# Patient Record
Sex: FEMALE | Race: WHITE | NOT HISPANIC OR LATINO | Employment: PART TIME | URBAN - METROPOLITAN AREA
[De-identification: names, ages, dates, MRNs, and addresses within clinical notes are randomized per-mention and may not be internally consistent; named-entity substitution may affect disease eponyms.]

---

## 2017-02-10 ENCOUNTER — OFFICE VISIT (OUTPATIENT)
Dept: URGENT CARE | Facility: MEDICAL CENTER | Age: 57
End: 2017-02-10
Payer: COMMERCIAL

## 2017-02-10 PROCEDURE — S9083 URGENT CARE CENTER GLOBAL: HCPCS

## 2017-02-10 PROCEDURE — G0382 LEV 3 HOSP TYPE B ED VISIT: HCPCS

## 2017-03-07 ENCOUNTER — ALLSCRIPTS OFFICE VISIT (OUTPATIENT)
Dept: OTHER | Facility: OTHER | Age: 57
End: 2017-03-07

## 2017-03-29 ENCOUNTER — TRANSCRIBE ORDERS (OUTPATIENT)
Dept: SLEEP CENTER | Facility: CLINIC | Age: 57
End: 2017-03-29

## 2017-03-29 DIAGNOSIS — R06.83 SNORING: Primary | ICD-10-CM

## 2017-03-30 ENCOUNTER — TRANSCRIBE ORDERS (OUTPATIENT)
Dept: ADMINISTRATIVE | Facility: HOSPITAL | Age: 57
End: 2017-03-30

## 2017-03-30 ENCOUNTER — HOSPITAL ENCOUNTER (OUTPATIENT)
Dept: RADIOLOGY | Facility: MEDICAL CENTER | Age: 57
Discharge: HOME | End: 2017-03-30
Payer: COMMERCIAL

## 2017-03-30 DIAGNOSIS — R06.02 SHORTNESS OF BREATH: ICD-10-CM

## 2017-03-30 DIAGNOSIS — R05.9 COUGH: ICD-10-CM

## 2017-03-30 DIAGNOSIS — R06.02 SHORTNESS OF BREATH: Primary | ICD-10-CM

## 2017-03-30 PROCEDURE — 71020 HB CHEST X-RAY 2VW FRONTAL&LATL: CPT

## 2017-05-02 ENCOUNTER — HOSPITAL ENCOUNTER (OUTPATIENT)
Dept: SLEEP CENTER | Facility: CLINIC | Age: 57
Discharge: HOME | End: 2017-05-02
Payer: COMMERCIAL

## 2017-05-02 DIAGNOSIS — R06.83 SNORING: ICD-10-CM

## 2017-06-07 DIAGNOSIS — Z12.31 ENCOUNTER FOR SCREENING MAMMOGRAM FOR MALIGNANT NEOPLASM OF BREAST: ICD-10-CM

## 2017-08-14 DIAGNOSIS — Z12.31 ENCOUNTER FOR SCREENING MAMMOGRAM FOR MALIGNANT NEOPLASM OF BREAST: ICD-10-CM

## 2017-08-15 ENCOUNTER — HOSPITAL ENCOUNTER (OUTPATIENT)
Dept: RADIOLOGY | Facility: MEDICAL CENTER | Age: 57
Discharge: HOME | End: 2017-08-15
Payer: COMMERCIAL

## 2017-08-15 DIAGNOSIS — Z12.31 ENCOUNTER FOR SCREENING MAMMOGRAM FOR MALIGNANT NEOPLASM OF BREAST: ICD-10-CM

## 2017-08-15 PROCEDURE — G0202 SCR MAMMO BI INCL CAD: HCPCS

## 2018-01-13 VITALS
SYSTOLIC BLOOD PRESSURE: 118 MMHG | BODY MASS INDEX: 30.18 KG/M2 | HEIGHT: 62 IN | WEIGHT: 164 LBS | DIASTOLIC BLOOD PRESSURE: 70 MMHG

## 2018-01-26 ENCOUNTER — TRANSCRIBE ORDERS (OUTPATIENT)
Dept: ADMINISTRATIVE | Facility: HOSPITAL | Age: 58
End: 2018-01-26

## 2018-01-26 DIAGNOSIS — E04.2 MULTIPLE THYROID NODULES: Primary | ICD-10-CM

## 2018-02-06 ENCOUNTER — HOSPITAL ENCOUNTER (OUTPATIENT)
Dept: RADIOLOGY | Facility: MEDICAL CENTER | Age: 58
Discharge: HOME | End: 2018-02-06
Payer: COMMERCIAL

## 2018-02-06 DIAGNOSIS — E04.2 MULTIPLE THYROID NODULES: ICD-10-CM

## 2018-02-06 PROCEDURE — 76536 US EXAM OF HEAD AND NECK: CPT

## 2018-03-20 ENCOUNTER — OFFICE VISIT (OUTPATIENT)
Dept: OBGYN CLINIC | Facility: MEDICAL CENTER | Age: 58
End: 2018-03-20
Payer: COMMERCIAL

## 2018-03-20 VITALS
SYSTOLIC BLOOD PRESSURE: 112 MMHG | HEIGHT: 62 IN | BODY MASS INDEX: 30 KG/M2 | WEIGHT: 163 LBS | DIASTOLIC BLOOD PRESSURE: 62 MMHG

## 2018-03-20 DIAGNOSIS — Z12.31 ENCOUNTER FOR SCREENING MAMMOGRAM FOR MALIGNANT NEOPLASM OF BREAST: ICD-10-CM

## 2018-03-20 DIAGNOSIS — Z11.51 SCREENING FOR HUMAN PAPILLOMAVIRUS (HPV): ICD-10-CM

## 2018-03-20 DIAGNOSIS — Z01.419 ENCOUNTER FOR GYNECOLOGICAL EXAMINATION (GENERAL) (ROUTINE) WITHOUT ABNORMAL FINDINGS: Primary | ICD-10-CM

## 2018-03-20 PROCEDURE — G0145 SCR C/V CYTO,THINLAYER,RESCR: HCPCS | Performed by: OBSTETRICS & GYNECOLOGY

## 2018-03-20 PROCEDURE — S0612 ANNUAL GYNECOLOGICAL EXAMINA: HCPCS | Performed by: OBSTETRICS & GYNECOLOGY

## 2018-03-20 PROCEDURE — 87624 HPV HI-RISK TYP POOLED RSLT: CPT | Performed by: OBSTETRICS & GYNECOLOGY

## 2018-03-20 RX ORDER — FAMOTIDINE 40 MG/1
TABLET, FILM COATED ORAL
COMMUNITY
Start: 2018-03-18

## 2018-03-20 RX ORDER — SPIRONOLACTONE 25 MG/1
TABLET ORAL
COMMUNITY
Start: 2018-03-20 | End: 2019-08-13 | Stop reason: SDUPTHER

## 2018-03-20 RX ORDER — THIAMINE HCL 100 MG
TABLET ORAL
COMMUNITY
End: 2019-08-13 | Stop reason: SDUPTHER

## 2018-03-20 RX ORDER — LANOLIN ALCOHOL/MO/W.PET/CERES
CREAM (GRAM) TOPICAL
COMMUNITY
End: 2019-08-13 | Stop reason: SDUPTHER

## 2018-03-20 RX ORDER — CETIRIZINE HYDROCHLORIDE 5 MG/1
TABLET ORAL
COMMUNITY

## 2018-03-20 RX ORDER — ROSUVASTATIN CALCIUM 5 MG/1
TABLET, COATED ORAL
COMMUNITY

## 2018-03-20 RX ORDER — OMEPRAZOLE 40 MG/1
40 CAPSULE, DELAYED RELEASE ORAL EVERY MORNING
Refills: 3 | COMMUNITY
Start: 2018-01-28

## 2018-03-20 RX ORDER — AMLODIPINE BESYLATE 5 MG/1
TABLET ORAL
COMMUNITY
Start: 2018-03-18

## 2018-03-20 NOTE — PROGRESS NOTES
Aaron Ramirez is a 65 y o  female who is here for a routine gyn exam,     has no new problems       Patient Active Problem List   Diagnosis    Arthralgia of left knee    Bursitis of left knee    Esophageal reflux    Hyperlipidemia    Primary osteoarthritis of left knee         Social History     Social History    Marital status: /Civil Union     Spouse name: N/A    Number of children: N/A    Years of education: N/A     Occupational History    Not on file       Social History Main Topics    Smoking status: Never Smoker    Smokeless tobacco: Never Used    Alcohol use Yes      Comment: Social     Drug use: No    Sexual activity: Yes     Partners: Male     Other Topics Concern    Not on file     Social History Narrative    Daily coffee consumption (1 cups/day)    Recreation activities: walking          Family History   Problem Relation Age of Onset    Alzheimer's disease Mother     Heart disease Mother      cardiac disorder     Hypertension Mother    Karlie Santiago Breast cancer Mother     Diabetes Father     Lung cancer Father     Thymic aplasia Sister     Thyroid cancer Sister     Colon cancer Paternal Grandmother     Diabetes Paternal Grandmother     Arthritis Family     Breast cancer Paternal Aunt      Past Medical History:   Diagnosis Date    Fibroadenoma of breast     resolved: 1999, left    Hypertension     Myocardial infarction     Polyp, colonic     Uterine leiomyoma     Vacuum extractor delivery, delivered     1988 son       Current Outpatient Prescriptions:     amLODIPine (NORVASC) 5 mg tablet, , Disp: , Rfl:     aspirin 81 MG tablet, Take by mouth, Disp: , Rfl:     B Complex Vitamins (B COMPLEX 100 PO), Take by mouth, Disp: , Rfl:     cetirizine (ZyrTEC) 5 MG tablet, Take by mouth, Disp: , Rfl:     Cyanocobalamin (VITAMIN B-12) 2500 MCG SUBL, Place under the tongue, Disp: , Rfl:     famotidine (PEPCID) 40 MG tablet, , Disp: , Rfl:     folic acid (FOLVITE) 670 mcg tablet, Take by mouth, Disp: , Rfl:     Multiple Vitamin (MULTI-VITAMIN DAILY PO), Take by mouth, Disp: , Rfl:     omeprazole (PriLOSEC) 40 MG capsule, Take 40 mg by mouth every morning, Disp: , Rfl: 3    rosuvastatin (CRESTOR) 5 mg tablet, Take by mouth, Disp: , Rfl:     spironolactone (ALDACTONE) 25 mg tablet, , Disp: , Rfl:     Review of Systems   Constitutional: Negative for fatigue  Respiratory: Negative for shortness of breath  Cardiovascular: Negative for chest pain and palpitations  Gastrointestinal: Negative for abdominal distention, abdominal pain and constipation  Genitourinary: Negative for dyspareunia, frequency, hematuria and pelvic pain  Bladder control: stress incontinence no                             urgency   no    2 Para       denies complaints with intercourse  Gynecologic History  No LMP recorded  Last Pap:   Results were: normal  Last mammogram: 2017   Results were: normal        The following portions of the patient's history were reviewed and updated as appropriate: allergies, current medications, past family history, past medical history, past social history, past surgical history and problem list     Review of Systems  Review of Systems     Objective     /62   Ht 5' 2" (1 575 m)   Wt 73 9 kg (163 lb)   BMI 29 81 kg/m²     General Appearance:    Alert, cooperative, no distress, appears stated age                               Lungs:     Clear to auscultation bilaterally, respirations unlabored        Heart:    Regular rate and rhythm, S1 and S2 normal, no murmur, rub   or gallop   Breast Exam:  normal nipple, no mass   Abdomen:     Soft, non-tender, bowel sounds active all four quadrants,     no masses, no organomegaly     Genitalia      :Vulva: normal, no lesions  Vagina: normal mucosa  Cervix: normal appearance and thin prep PAP obtained  Uterus: normal size, anteverted, non-tender  Adnexa: normal adnexa and no mass, fullness, tenderness     Rectal:   normal Extremities:   Extremities normal, atraumatic, no cyanosis or edema       Skin:   Skin color, texture, turgor normal, no rashes or lesions   Lymph nodes:   Cervical, supraclavicular, and axillary nodes normal             Assessment:  Encounter Diagnoses   Name Primary?  Encounter for gynecological examination (general) (routine) without abnormal findings Yes    Encounter for screening mammogram for malignant neoplasm of breast            Normal gynecological evalation and well visit         Plan     Education reviewed: none

## 2018-03-23 LAB — HPV RRNA GENITAL QL NAA+PROBE: NORMAL

## 2018-03-27 LAB
LAB AP GYN PRIMARY INTERPRETATION: NORMAL
Lab: NORMAL

## 2018-10-10 ENCOUNTER — HOSPITAL ENCOUNTER (OUTPATIENT)
Dept: RADIOLOGY | Facility: MEDICAL CENTER | Age: 58
Discharge: HOME | End: 2018-10-10
Payer: COMMERCIAL

## 2018-10-10 DIAGNOSIS — Z12.31 ENCOUNTER FOR SCREENING MAMMOGRAM FOR MALIGNANT NEOPLASM OF BREAST: ICD-10-CM

## 2018-10-10 PROCEDURE — 77067 SCR MAMMO BI INCL CAD: CPT

## 2019-03-09 ENCOUNTER — OFFICE VISIT (OUTPATIENT)
Dept: URGENT CARE | Facility: MEDICAL CENTER | Age: 59
End: 2019-03-09
Payer: COMMERCIAL

## 2019-03-09 VITALS
HEIGHT: 62 IN | DIASTOLIC BLOOD PRESSURE: 96 MMHG | OXYGEN SATURATION: 97 % | WEIGHT: 177.6 LBS | TEMPERATURE: 98.7 F | SYSTOLIC BLOOD PRESSURE: 138 MMHG | RESPIRATION RATE: 16 BRPM | BODY MASS INDEX: 32.68 KG/M2 | HEART RATE: 102 BPM

## 2019-03-09 DIAGNOSIS — S20.211A CONTUSION OF RIB ON RIGHT SIDE, INITIAL ENCOUNTER: Primary | ICD-10-CM

## 2019-03-09 PROCEDURE — 99213 OFFICE O/P EST LOW 20 MIN: CPT | Performed by: PHYSICIAN ASSISTANT

## 2019-03-09 RX ORDER — LISINOPRIL 5 MG/1
5 TABLET ORAL DAILY
Refills: 3 | COMMUNITY
Start: 2019-01-03

## 2019-03-09 RX ORDER — OMEPRAZOLE 40 MG/1
CAPSULE, DELAYED RELEASE ORAL
Refills: 3 | COMMUNITY
Start: 2019-01-20

## 2019-03-09 NOTE — PROGRESS NOTES
[facility]        NAME: Marco Dawkins is a 63 y o  female  : 1960    MRN: [id number]  DATE: 2019  TIME: 9:08 AM    Assessment and Plan   Contusion of rib on right side, initial encounter [S20 211A]  1  Contusion of rib on right side, initial encounter  XR ribs right w pa chest min 3 views         Patient Instructions     Contusion ribs  Contusion left wrist  Rest, ice, elevate, over the counter ibuprofen  Follow up with PCP in 3-5 days  Proceed to  ER if symptoms worsen  Chief Complaint     Chief Complaint   Patient presents with   [place]     pt fell last night pain under ight rib and left wrist and knee- fell around 66         History of Present Illness       64 y/o female who [place] the fire [place] was going up the stairs her foot got stuck in a step and fell forward  Patient c/o pain to left wrist, left knee and mostly on right ribs  Denies head trauma, LOC, n/v, dizziness      Review of Systems   Review of Systems   Constitutional: Negative  HENT: Negative  Eyes: Negative  Respiratory: Negative  Negative for cough, chest tightness, shortness of breath, wheezing and stridor  Cardiovascular: Negative  Negative for chest pain, palpitations and leg swelling  Musculoskeletal: Positive for myalgias           Current Medications       Current Outpatient Medications:     amLODIPine (NORVASC) 5 mg tablet, , Disp: , Rfl:     aspirin 81 MG tablet, Take by mouth, Disp: , Rfl:     B Complex Vitamins (B COMPLEX 100 PO), Take by mouth, Disp: , Rfl:     cetirizine (ZyrTEC) 5 MG tablet, Take by mouth, Disp: , Rfl:     Cyanocobalamin (VITAMIN B-12) 2500 MCG SUBL, Place under the tongue, Disp: , Rfl:     famotidine (PEPCID) 40 MG tablet, , Disp: , Rfl:     folic acid (FOLVITE) 200 mcg tablet, Take by mouth, Disp: , Rfl:     lisinopril (ZESTRIL) 5 mg tablet, Take 5 mg by mouth daily, Disp: , Rfl: 3    Multiple Vitamin (MULTI-VITAMIN DAILY PO), Take by mouth, Disp: , Rfl:     omeprazole (PriLOSEC) 40 MG capsule, Take 40 mg by mouth every morning, Disp: , Rfl: 3    omeprazole (PriLOSEC) 40 MG capsule, TAKE ONE CAPSULE BY MOUTH IN THE MORNING, Disp: , Rfl: 3    rosuvastatin (CRESTOR) 5 mg tablet, Take by mouth, Disp: , Rfl:     spironolactone (ALDACTONE) 25 mg tablet, , Disp: , Rfl:     Current Allergies     Allergies as of 03/09/2019 - Reviewed 03/09/2019   Allergen Reaction Noted    Penicillins Rash 08/15/2012    Sulfa antibiotics Rash 08/15/2012            The following portions of the patient's history were reviewed and updated as appropriate: allergies, current medications, past family history, past medical history, past social history, past surgical history and problem list      Past Medical History:   Diagnosis Date    Fibroadenoma of breast     resolved: 1999, left    Hypertension     Myocardial infarction ([address] )     Polyp, colonic     Uterine leiomyoma     Vacuum extractor delivery, delivered     81 son       Past Surgical History:   Procedure Laterality Date    BREAST LUMPECTOMY Left     resolved: 1999; fibroadenoma   [address] [facility] son    COLONOSCOPY      complete    ESOPHAGOGASTRODUODENOSCOPY      ROTATOR CUFF REPAIR Right 2008       Family History   Problem Relation Age of Onset    Alzheimer's disease Mother     Heart disease Mother         cardiac disorder     Hypertension Mother     Breast cancer Mother     Diabetes Father     Lung cancer Father     Thymic aplasia Sister     Thyroid cancer Sister     Colon cancer Paternal Grandmother     Diabetes Paternal Grandmother     Arthritis Family     Breast cancer Paternal Aunt          Medications have been verified          Objective   /96   Pulse 102   Temp 98 7 °F (37 1 °C) (Temporal)   Resp 16   Ht 5' 2" (1 575 m)   Wt 80 6 kg (177 lb 9 6 oz)   SpO2 97%   BMI 32 48 kg/m²        Physical Exam     Physical Exam   Constitutional: She appears well-developed and well-nourished  HENT:   Head: Normocephalic and atraumatic  Right Ear: External ear normal    Left Ear: External ear normal    Nose: Nose normal    Mouth/Throat: Oropharynx is clear and moist  No oropharyngeal exudate  Neck: Normal range of motion  Neck supple  Cardiovascular: Normal rate, regular rhythm, normal heart sounds and intact distal pulses  Pulmonary/Chest: Effort normal and breath sounds normal  No respiratory distress  She has no wheezes  She has no rales  She exhibits no tenderness  Lymphadenopathy:     She has no cervical adenopathy       Xray negative

## 2019-03-09 NOTE — PATIENT INSTRUCTIONS
Contusion ribs  Contusion left wrist  Rest, ice, elevate, over the counter ibuprofen  Follow up with PCP in 3-5 days  Proceed to  ER if symptoms worsen  Rib Contusion   WHAT YOU NEED TO KNOW:   A rib contusion is a bruise on one or more of your ribs  DISCHARGE INSTRUCTIONS:   Return to the emergency department if:   · You have increased chest pain  · You have shortness of breath  · You start to cough up blood  · Your pain does not improve with pain medicine  Contact your healthcare provider if:   · You have a cough  · You have a fever  · You have questions or concerns about your condition or care  Medicines: You may need any of the following:  · NSAIDs , such as ibuprofen, help decrease swelling, pain, and fever  This medicine is available with or without a doctor's order  NSAIDs can cause stomach bleeding or kidney problems in certain people  If you take blood thinner medicine, always ask if NSAIDs are safe for you  Always read the medicine label and follow directions  Do not give these medicines to children under 10months of age without direction from your child's healthcare provider  · Prescription pain medicine  may be given  Ask how to take this medicine safely  · Take your medicine as directed  Contact your healthcare provider if you think your medicine is not helping or if you have side effects  Tell him of her if you are allergic to any medicine  Keep a list of the medicines, vitamins, and herbs you take  Include the amounts, and when and why you take them  Bring the list or the pill bottles to follow-up visits  Carry your medicine list with you in case of an emergency  Deep breathing:   · To help prevent pneumonia, take 10 deep breaths every hour, even when you wake up during the night  Brace your ribs with your hands or a pillow while you take deep breaths or cough  This will help decrease your pain      · You may need to use an incentive spirometer to help you take deeper breaths  Put the plastic piece into your mouth and take a very deep breath  Hold your breath as long as you can  Then let out your breath  Do this 10 times in a row every hour while you are awake  Rest:  Rest your ribs to decrease swelling and allow the injury to heal faster  Avoid activities that may cause more pain or damage to your ribs  As your pain decreases, begin movements slowly  Ice:  Ice helps decrease swelling and pain  Ice may also help prevent tissue damage  Use an ice pack or put crushed ice in a plastic bag  Cover it with a towel and place it on your bruised area for 15 to 20 minutes every hour as directed  Follow up with your healthcare provider as directed:  Write down your questions so you remember to ask them during your visits  © 2017 [facility] Information is for End User's use only and may not be sold, redistributed or otherwise used for commercial purposes  All illustrations and images included in CareNotes® are the copyrighted property of A D A M [employer] Inc  or Augie Aparicio  The above information is an  only  It is not intended as medical advice for individual conditions or treatments  Talk to your doctor, nurse or pharmacist before following any medical regimen to see if it is safe and effective for you

## 2019-08-12 ENCOUNTER — TELEPHONE (OUTPATIENT)
Dept: OBGYN CLINIC | Facility: CLINIC | Age: 59
End: 2019-08-12

## 2019-08-12 NOTE — TELEPHONE ENCOUNTER
Pt calling with complaint of possible yeast infection, burning and itching  She has been on antibiotics for something else, this is the 2nd week of having these symptoms  It did go away but came back since had to take more antibiotics  Has not tried anything OTC  Yearly scheduled for tomorrow with [place] Hayes that pt is aware may be changed to an office visit if unable to treat over the phone

## 2019-08-13 ENCOUNTER — ANNUAL EXAM (OUTPATIENT)
Dept: OBGYN CLINIC | Facility: MEDICAL CENTER | Age: 59
End: 2019-08-13
Payer: COMMERCIAL

## 2019-08-13 VITALS
WEIGHT: 186.8 LBS | BODY MASS INDEX: 34.37 KG/M2 | SYSTOLIC BLOOD PRESSURE: 102 MMHG | HEIGHT: 62 IN | DIASTOLIC BLOOD PRESSURE: 72 MMHG

## 2019-08-13 DIAGNOSIS — Z12.31 ENCOUNTER FOR SCREENING MAMMOGRAM FOR MALIGNANT NEOPLASM OF BREAST: ICD-10-CM

## 2019-08-13 DIAGNOSIS — Z01.419 ROUTINE GYNECOLOGICAL EXAMINATION: Primary | ICD-10-CM

## 2019-08-13 PROCEDURE — S0612 ANNUAL GYNECOLOGICAL EXAMINA: HCPCS | Performed by: PHYSICIAN ASSISTANT

## 2019-08-13 RX ORDER — CEFACLOR 500 MG
500 CAPSULE ORAL 2 TIMES DAILY
Refills: 0 | COMMUNITY
Start: 2019-08-05 | End: 2019-08-13 | Stop reason: SDUPTHER

## 2019-08-13 RX ORDER — CEFACLOR 500 MG
CAPSULE ORAL
COMMUNITY
Start: 2019-08-05 | End: 2019-08-13 | Stop reason: SDUPTHER

## 2019-08-13 RX ORDER — PREDNISONE 10 MG/1
TABLET ORAL
Refills: 0 | COMMUNITY
Start: 2019-08-05 | End: 2020-07-06

## 2019-08-13 NOTE — ASSESSMENT & PLAN NOTE
I have discussed the importance of monthly self-breast exams, exercise and healthy diet as well as adequate intake of calcium and vitamin D  The patient declines STD testing  The current ASCCP guidelines were reviewed  The low risk patient will receive pap smear screening every 3 years or pap with HPV co-testing every 5 years  The patient previously had PAP in 2018 and is due again in 4 years  I emphasized the importance of an annual pelvic and breast exam  A yearly mammogram is recommended for breast cancer screening starting at age 36  All questions have been answered to her satisfaction

## 2019-08-13 NOTE — PROGRESS NOTES
Assessment/Plan:    Routine gynecological examination  I have discussed the importance of monthly self-breast exams, exercise and healthy diet as well as adequate intake of calcium and vitamin D  The patient declines STD testing  The current ASCCP guidelines were reviewed  The low risk patient will receive pap smear screening every 3 years or pap with HPV co-testing every 5 years  The patient previously had PAP in 2018 and is due again in 4 years  I emphasized the importance of an annual pelvic and breast exam  A yearly mammogram is recommended for breast cancer screening starting at age 36  All questions have been answered to her satisfaction  Diagnoses and all orders for this visit:    Routine gynecological examination    Encounter for screening mammogram for malignant neoplasm of breast  -     Mammo screening bilateral w 3d & cad; Future    Other orders  -     Discontinue: cefaclor (CECLOR) 500 mg capsule; Take 500 mg by mouth 2 (two) times a day  -     Discontinue: cefaclor (CECLOR) 500 mg capsule  -     predniSONE 10 mg tablet; TAKE 2 TABLETS TWICE A DAY FOR 5 DAYS        Subjective:      Patient ID: Nini Leal is a 63 y o  female  The patient comes in today for annual Gyn exam  The patient has no history of PMB, pelvic pain, abnormal vaginal discharge, breast mass or lumps, urinary symptoms, urinary incontinence, depression, and pelvic/vaginal pressure  Pt reports all additional systems reviewed are negative  The patient admits to monthly self breast exams, regular exercise, healthy diet, and calcium and Vitamin D intake  Called office yesterday d/t s/sx yeast infection (recently had two rounds ABX) - had irritation, pruritus, white d/c  Took Diflucan dose - feels much improved today  Aware to take 2nd dose in 72 hrs  Last PAP 2018, WNL   Hpv neg  Up to date w/ mammo/colonoscopy        The following portions of the patient's history were reviewed and updated as appropriate: allergies, current medications, past family history, past medical history, past social history, past surgical history and problem list     Review of Systems   Constitutional: Negative for appetite change, fatigue and unexpected weight change  Respiratory: Negative for chest tightness and shortness of breath  Cardiovascular: Negative for chest pain, palpitations and leg swelling  Gastrointestinal: Negative for abdominal pain, constipation, diarrhea, nausea and vomiting  Genitourinary: Negative for difficulty urinating, dyspareunia, menstrual problem, pelvic pain and vaginal discharge  Musculoskeletal: Negative for arthralgias and myalgias  Neurological: Negative for dizziness, light-headedness and headaches  Psychiatric/Behavioral: Negative for dysphoric mood  The patient is not nervous/anxious  All other systems reviewed and are negative  Objective:      /72 (BP Location: Left arm, Patient Position: Sitting, Cuff Size: Standard)   Ht 5' 2" (1 575 m)   Wt 84 7 kg (186 lb 12 8 oz)   Breastfeeding? No   BMI 34 17 kg/m²          Physical Exam   Constitutional: She is oriented to person, place, and time  She appears well-developed and well-nourished  HENT:   Head: Normocephalic and atraumatic  Neck: Neck supple  No thyromegaly present  Cardiovascular: Normal rate and regular rhythm  Pulmonary/Chest: Effort normal and breath sounds normal  Right breast exhibits no inverted nipple, no mass, no nipple discharge, no skin change and no tenderness  Left breast exhibits no inverted nipple, no mass, no nipple discharge, no skin change and no tenderness  No breast tenderness, discharge or bleeding  Abdominal: Soft  Bowel sounds are normal  Hernia confirmed negative in the right inguinal area and confirmed negative in the left inguinal area  Genitourinary: Vagina normal and uterus normal  No breast tenderness, discharge or bleeding  There is no rash, tenderness, lesion or injury on the right labia  There is no rash, tenderness, lesion or injury on the left labia  Uterus is not deviated, not enlarged, not fixed and not tender  Cervix exhibits no motion tenderness, no discharge and no friability  Right adnexum displays no mass, no tenderness and no fullness  Left adnexum displays no mass, no tenderness and no fullness  No vaginal discharge found  Neurological: She is alert and oriented to person, place, and time  Skin: Skin is warm and dry  Psychiatric: She has a normal mood and affect  Nursing note and vitals reviewed

## 2020-02-04 ENCOUNTER — HOSPITAL ENCOUNTER (OUTPATIENT)
Dept: RADIOLOGY | Facility: MEDICAL CENTER | Age: 60
Discharge: HOME | End: 2020-02-04
Payer: COMMERCIAL

## 2020-02-04 VITALS — WEIGHT: 186 LBS | BODY MASS INDEX: 34.23 KG/M2 | HEIGHT: 62 IN

## 2020-02-04 DIAGNOSIS — Z12.31 ENCOUNTER FOR SCREENING MAMMOGRAM FOR MALIGNANT NEOPLASM OF BREAST: ICD-10-CM

## 2020-02-04 PROCEDURE — 77063 BREAST TOMOSYNTHESIS BI: CPT

## 2020-02-04 PROCEDURE — 77067 SCR MAMMO BI INCL CAD: CPT

## 2020-02-12 ENCOUNTER — HOSPITAL ENCOUNTER (OUTPATIENT)
Dept: ULTRASOUND IMAGING | Facility: CLINIC | Age: 60
Discharge: HOME | End: 2020-02-12
Payer: COMMERCIAL

## 2020-02-12 DIAGNOSIS — R92.8 ABNORMAL MAMMOGRAM: ICD-10-CM

## 2020-02-12 PROCEDURE — 76642 ULTRASOUND BREAST LIMITED: CPT

## 2020-07-06 ENCOUNTER — APPOINTMENT (OUTPATIENT)
Dept: RADIOLOGY | Facility: MEDICAL CENTER | Age: 60
End: 2020-07-06
Payer: COMMERCIAL

## 2020-07-06 ENCOUNTER — OFFICE VISIT (OUTPATIENT)
Dept: OBGYN CLINIC | Facility: MEDICAL CENTER | Age: 60
End: 2020-07-06
Payer: COMMERCIAL

## 2020-07-06 VITALS
TEMPERATURE: 99.2 F | BODY MASS INDEX: 32.72 KG/M2 | SYSTOLIC BLOOD PRESSURE: 123 MMHG | DIASTOLIC BLOOD PRESSURE: 80 MMHG | HEART RATE: 101 BPM | WEIGHT: 177.8 LBS | HEIGHT: 62 IN

## 2020-07-06 DIAGNOSIS — M79.642 HAND PAIN, LEFT: ICD-10-CM

## 2020-07-06 DIAGNOSIS — M19.032 ARTHRITIS OF LEFT WRIST: ICD-10-CM

## 2020-07-06 DIAGNOSIS — M19.031 ARTHRITIS OF RIGHT WRIST: ICD-10-CM

## 2020-07-06 DIAGNOSIS — M79.641 HAND PAIN, RIGHT: ICD-10-CM

## 2020-07-06 DIAGNOSIS — M79.642 HAND PAIN, LEFT: Primary | ICD-10-CM

## 2020-07-06 PROCEDURE — 73130 X-RAY EXAM OF HAND: CPT

## 2020-07-06 PROCEDURE — 99203 OFFICE O/P NEW LOW 30 MIN: CPT | Performed by: ORTHOPAEDIC SURGERY

## 2020-07-06 PROCEDURE — 20605 DRAIN/INJ JOINT/BURSA W/O US: CPT | Performed by: ORTHOPAEDIC SURGERY

## 2020-07-06 RX ORDER — ALPRAZOLAM 0.25 MG/1
TABLET ORAL
COMMUNITY
Start: 2020-03-23

## 2020-07-06 RX ORDER — ETHACRYNIC ACID 25 MG/1
TABLET ORAL
COMMUNITY
Start: 2019-12-18 | End: 2020-07-06

## 2020-07-06 RX ORDER — AZELASTINE 1 MG/ML
SPRAY, METERED NASAL
COMMUNITY
Start: 2020-06-13

## 2020-07-06 RX ORDER — DOXYCYCLINE 100 MG/1
100 CAPSULE ORAL 2 TIMES DAILY
COMMUNITY
Start: 2020-06-09 | End: 2020-07-06

## 2020-07-06 RX ADMIN — LIDOCAINE HYDROCHLORIDE 0.5 ML: 10 INJECTION, SOLUTION INFILTRATION; PERINEURAL at 14:22

## 2020-07-06 RX ADMIN — LIDOCAINE HYDROCHLORIDE 2 ML: 10 INJECTION, SOLUTION INFILTRATION; PERINEURAL at 14:22

## 2020-07-06 RX ADMIN — Medication 0.5 MEQ: at 14:22

## 2020-07-06 RX ADMIN — TRIAMCINOLONE ACETONIDE 20 MG: 40 INJECTION, SUSPENSION INTRA-ARTICULAR; INTRAMUSCULAR at 14:22

## 2020-07-06 NOTE — PROGRESS NOTES
CHIEF COMPLAINT:  Chief Complaint   Patient presents with    Left Hand - Pain    Right Hand - Pain       SUBJECTIVE:  Alethia Phalen is a 62y o  year old RHD female who presents today for bilateral hand pain  She states that over the last couple of months she has had increase hand pain  Right is worse than left  She notes deformity of the right at the thumb and MP joints of the index and long fingers  She has taken Tylenol no NSAIDs, use some ice, not heat  No topical agents  Denies numbness and tingling into the fingers of either hand  Denies weakness in the hands  She works as a cook at [employer]         PAST MEDICAL HISTORY:  Past Medical History:   Diagnosis Date    Fibroadenoma of breast     resolved: , left    Hypertension     Myocardial infarction ([address] )     Polyp, colonic     Uterine leiomyoma     Vacuum extractor delivery, delivered      son       PAST SURGICAL HISTORY:  Past Surgical History:   Procedure Laterality Date    BREAST CYST EXCISION Left      SECTION      1986 son    COLONOSCOPY      complete    ESOPHAGOGASTRODUODENOSCOPY      ROTATOR CUFF REPAIR Right        FAMILY HISTORY:  Family History   Problem Relation Age of Onset    Alzheimer's disease Mother     Heart disease Mother         cardiac disorder     Hypertension Mother     Breast cancer Mother 78    Diabetes Father     Lung cancer Father     Thymic aplasia Sister     Thyroid cancer Sister     Colon cancer Paternal Grandmother 66    Diabetes Paternal Grandmother     Arthritis Family     Breast cancer Paternal Aunt 72    No Known Problems Maternal Grandmother     No Known Problems Maternal Grandfather     No Known Problems Paternal Grandfather     No Known Problems Son     No Known Problems Son     No Known Problems Brother     No Known Problems Sister     No Known Problems Paternal Aunt     No Known Problems Paternal Aunt     No Known Problems Paternal Aunt        SOCIAL HISTORY:  Social History     Tobacco Use    Smoking status: Never Smoker    Smokeless tobacco: Never Used   Substance Use Topics    Alcohol use: Yes     Comment: Social     Drug use: No       MEDICATIONS:    Current Outpatient Medications:     ALPRAZolam (XANAX) 0 25 mg tablet, Take by mouth, Disp: , Rfl:     amLODIPine (NORVASC) 5 mg tablet, , Disp: , Rfl:     azelastine (ASTELIN) 0 1 % nasal spray, INSTILL 2 SPRAYS IN EACH NOSTRIL TWICE A DAY, Disp: , Rfl:     cetirizine (ZyrTEC) 5 MG tablet, Take by mouth, Disp: , Rfl:     famotidine (PEPCID) 40 MG tablet, , Disp: , Rfl:     lisinopril (ZESTRIL) 5 mg tablet, Take 5 mg by mouth daily, Disp: , Rfl: 3    omeprazole (PriLOSEC) 40 MG capsule, Take 40 mg by mouth every morning, Disp: , Rfl: 3    omeprazole (PriLOSEC) 40 MG capsule, TAKE ONE CAPSULE BY MOUTH IN THE MORNING, Disp: , Rfl: 3    rosuvastatin (CRESTOR) 5 mg tablet, Take by mouth, Disp: , Rfl:     ALLERGIES:  Allergies   Allergen Reactions    Penicillin G Rash    Penicillins Rash    Sulfa Antibiotics Rash    Sulfamethoxazole-Trimethoprim Rash       REVIEW OF SYSTEMS:  Review of Systems   Constitutional: Negative for chills, fever and unexpected weight change  HENT: Negative for hearing loss, nosebleeds and sore throat  Eyes: Negative for pain, redness and visual disturbance  Respiratory: Negative for cough, shortness of breath and wheezing  Cardiovascular: Negative for chest pain, palpitations and leg swelling  Gastrointestinal: Negative for abdominal pain and nausea  Genitourinary: Negative for dyspareunia, dysuria and frequency  Skin: Negative for rash and wound  Neurological: Negative for dizziness, numbness and headaches  Psychiatric/Behavioral: Negative for decreased concentration and suicidal ideas  The patient is not nervous/anxious          VITALS:  Vitals:    07/06/20 1358   BP: 123/80   Pulse: 101   Temp: 99 2 °F (37 3 °C)       LABS:  HgA1c: No results found for: HGBA1C  BMP:   Lab Results   Component Value Date    GLUCOSE 98 08/03/2015    CALCIUM 9 9 08/03/2015     08/03/2015    K 4 1 08/03/2015    CO2 28 08/03/2015     08/03/2015    BUN 15 08/03/2015    CREATININE 0 75 08/03/2015       _____________________________________________________  PHYSICAL EXAMINATION:  General: well developed and well nourished, alert, oriented times 3 and appears comfortable  Psychiatric: Normal  HEENT: Trachea Midline, No torticollis  Pulmonary: No audible wheezing or strider  Cardiovascular: No discernable arrhythmia   Skin: No masses, erythema, lacerations, fluctation, ulcerations  Neurovascular: Sensation Intact to the Median, Ulnar, Radial Nerve, Motor Intact to the Median, Ulnar, Radial Nerve and Pulses Intact    MUSCULOSKELETAL EXAMINATION:  Right hand:     Skin is intact no ecchymosis  Mild swelling over the radiocarpal joint, MP joints of the index and long fingers  + TTP over the radiocarpal joint, CMC joint of thumb and MP joints of the index and long fingers  Has painful but full ROM of all fingers  Sensation is intact to light touch, Radial pulse +2  Brisk capillary refill     Left hand:    Skin is intact no ecchymosis  No swelling over the MP joints of the index and long fingers  Has painful but full ROM of all fingers     Sensation is intact to light touch, Radial pulse +2  Brisk capillary refill     ___________________________________________________  STUDIES REVIEWED:  Images obtained today of the right hand 3 views demonstrate arthritic change at the Radiocarpal joint, CMC joint of the thumb,   Images obtained today of the left hand 3 views demonstrate arthritic change at the MP joints of the index and long fingers    PROCEDURES PERFORMED:  Medium joint arthrocentesis: R radiocarpal  Date/Time: 7/6/2020 2:22 PM  Consent given by: patient  Site marked: site marked  Timeout: Immediately prior to procedure a time out was called to verify the correct patient, procedure, equipment, support staff and site/side marked as required   Supporting Documentation  Indications: joint swelling and pain   Procedure Details  Location: wrist - R radiocarpal  Preparation: Patient was prepped and draped in the usual sterile fashion  Needle size: 25 G  Ultrasound guidance: no  Approach: dorsal  Medications administered: 0 5 mEq sodium bicarbonate 8 4 %; 2 mL lidocaine 1 %; 0 5 mL lidocaine 1 %; 20 mg triamcinolone acetonide 40 mg/mL    Patient tolerance: patient tolerated the procedure well with no immediate complications  Dressing:  Sterile dressing applied        _____________________________________________________  ASSESSMENT/PLAN:    Right radiocarpal joint arthritis     * Xrays of the right hand were reviewed that shows arthritic changes in multiple joints  * On exam, she had multiple areas of pain and tenderness  * A right radiocarpal joint injection was given   * At the next visit, the injection response will be evaluated and may consider a right thumb CMC joint injection  Left hand and wrist osteoarthritis     * xrays of the left hand were reviewed that show osteoarthritis of the hand  * At this time no treatments for the left will be done  * Continue with Extra Strength Tylenol  Patient has declined wanting to take NSAIDs  * Bracing not considered for arthritis of the hands and wrists due to it increasing pain  * Suggested to use heat in the morning to increase motion, after work or activities use ice and then back to heat prior to bed  Diagnoses and all orders for this visit:    Hand pain, left  Hand pain, right  Arthritis of right wrist  Arthritis of left wrist    Follow Up:  Return in about 2 weeks (around 7/20/2020) for  right wrist     Work/school status:  Full Duty    To Do Next Visit:  Re-evaluation of current issue    General Discussions:  Osteoarthritis:  The anatomy and physiology of osteoarthritis was discussed with the patient today in the office  Deterioration of the articular cartilage eventually leads to altered mobility at the joint, resulting in joint subluxation, osteophyte formation, cystic changes, as well as subchondral sclerosis  Eventually, pain, limited mobility, and compensatory hypermobility at surrounding joints may develop  While normal activity and usage of the joint may provide a painful experience to the patient, this typically does not result in damage to the limb  Treatment options include splints to decreased joint edema, pain, and inflammation  Therapy exercises to strengthen the surrounding musculature may relieve pain, but do not alter the overall continued development of osteoarthritis  Oral medications, topical medications, corticosteroid injections may decrease pain and increase overall function  Eventually, some patients may require surgical intervention         Scribe Attestation    I,:   Hari Zacarias am acting as a scribe while in the presence of the attending physician :        I,:   Aníbal Yung MD personally performed the services described in this documentation    as scribed in my presence :

## 2020-07-07 RX ORDER — TRIAMCINOLONE ACETONIDE 40 MG/ML
20 INJECTION, SUSPENSION INTRA-ARTICULAR; INTRAMUSCULAR
Status: COMPLETED | OUTPATIENT
Start: 2020-07-06 | End: 2020-07-06

## 2020-07-07 RX ORDER — LIDOCAINE HYDROCHLORIDE 10 MG/ML
0.5 INJECTION, SOLUTION INFILTRATION; PERINEURAL
Status: COMPLETED | OUTPATIENT
Start: 2020-07-06 | End: 2020-07-06

## 2020-07-07 RX ORDER — LIDOCAINE HYDROCHLORIDE 10 MG/ML
2 INJECTION, SOLUTION INFILTRATION; PERINEURAL
Status: COMPLETED | OUTPATIENT
Start: 2020-07-06 | End: 2020-07-06

## 2020-10-01 ENCOUNTER — ANNUAL EXAM (OUTPATIENT)
Dept: OBGYN CLINIC | Facility: MEDICAL CENTER | Age: 60
End: 2020-10-01
Payer: COMMERCIAL

## 2020-10-01 VITALS
BODY MASS INDEX: 31.09 KG/M2 | SYSTOLIC BLOOD PRESSURE: 132 MMHG | DIASTOLIC BLOOD PRESSURE: 84 MMHG | TEMPERATURE: 99.6 F | WEIGHT: 170 LBS

## 2020-10-01 DIAGNOSIS — Z01.419 ROUTINE GYNECOLOGICAL EXAMINATION: Primary | ICD-10-CM

## 2020-10-01 DIAGNOSIS — Z12.31 ENCOUNTER FOR SCREENING MAMMOGRAM FOR MALIGNANT NEOPLASM OF BREAST: ICD-10-CM

## 2020-10-01 PROCEDURE — S0612 ANNUAL GYNECOLOGICAL EXAMINA: HCPCS | Performed by: NURSE PRACTITIONER

## 2021-01-09 ENCOUNTER — NURSE TRIAGE (OUTPATIENT)
Dept: OTHER | Facility: OTHER | Age: 61
End: 2021-01-09

## 2021-01-09 DIAGNOSIS — Z20.828 EXPOSURE TO SARS-ASSOCIATED CORONAVIRUS: Primary | ICD-10-CM

## 2021-01-09 NOTE — TELEPHONE ENCOUNTER
Regarding: COVID/ Exposure  ----- Message from [facility] sent at 1/9/2021  7:29 AM EST -----  Pt states: "I was just with my son who tested positive for COVID and my  comes home from the hospital soon so I want to get tested to make sure I'm not sick "

## 2021-01-09 NOTE — TELEPHONE ENCOUNTER
Reason for Disposition   [1] CLOSE CONTACT COVID-19 EXPOSURE within last 14 days AND [2] NO symptoms    Answer Assessment - Initial Assessment Questions  1  COVID-19 CLOSE CONTACT: "Who is the person with the confirmed or suspected COVID-19 infection that you were exposed to?"      Son    2  PLACE of CONTACT: "Where were you when you were exposed to COVID-19?" (e g , home, school, medical waiting room; which city?)      Son's home    3  TYPE of CONTACT: "How much contact was there?" (e g , sitting next to, live in same house, work in same office, same building)      Standing next to him     4  DURATION of CONTACT: "How long were you in contact with the COVID-19 patient?" (e g , a few seconds, passed by person, a few minutes, 15 minutes or longer, live with the patient)      10-15 mins     5  MASK: "Were you wearing a mask?" "Was the other person wearing a mask?" Note: wearing a mask reduces the risk of an   otherwise close contact  Denies     6  DATE of CONTACT: "When did you have contact with a COVID-19 patient?" (e g , how many days ago)      1/6/2020    7  COMMUNITY SPREAD: "Are there lots of cases of COVID-19 (community spread) where you live?" (See public health department website, if unsure)        [facility]     8  SYMPTOMS: "Do you have any symptoms?" (e g , fever, cough, breathing difficulty, loss of taste or smell)      Denies     9  PREGNANCY OR POSTPARTUM: "Is there any chance you are pregnant?" "When was your last menstrual period?" "Did you deliver in the last 2 weeks?"      Denies     10  HIGH RISK: "Do you have any heart or lung problems? Do you have a weak immune system?" (e g , heart failure, COPD, asthma, HIV positive, chemotherapy, renal failure, diabetes mellitus, sickle cell anemia, obesity)        Sleep apnea     11    TRAVEL: "Have you traveled out of the country recently?" If so, "When and where?"  Also ask about out-of-state travel, since the CDC has identified some high-risk cities for community spread in the [address] [place] [address] [address]  Note: Travel becomes less relevant if there is widespread community transmission where the patient lives          Denies    Protocols used: CORONAVIRUS (COVID-19) EXPOSURE-ADULT-AH

## 2021-01-10 DIAGNOSIS — Z20.828 EXPOSURE TO SARS-ASSOCIATED CORONAVIRUS: ICD-10-CM

## 2021-01-10 PROCEDURE — U0003 INFECTIOUS AGENT DETECTION BY NUCLEIC ACID (DNA OR RNA); SEVERE ACUTE RESPIRATORY SYNDROME CORONAVIRUS 2 (SARS-COV-2) (CORONAVIRUS DISEASE [COVID-19]), AMPLIFIED PROBE TECHNIQUE, MAKING USE OF HIGH THROUGHPUT TECHNOLOGIES AS DESCRIBED BY CMS-2020-01-R: HCPCS | Performed by: FAMILY MEDICINE

## 2021-01-10 PROCEDURE — U0005 INFEC AGEN DETEC AMPLI PROBE: HCPCS | Performed by: FAMILY MEDICINE

## 2021-01-12 LAB — SARS-COV-2 RNA SPEC QL NAA+PROBE: NOT DETECTED

## 2021-02-16 ENCOUNTER — TELEPHONE (OUTPATIENT)
Dept: GASTROENTEROLOGY | Facility: CLINIC | Age: 61
End: 2021-02-16

## 2021-06-10 ENCOUNTER — HOSPITAL ENCOUNTER (OUTPATIENT)
Dept: RADIOLOGY | Facility: MEDICAL CENTER | Age: 61
Discharge: HOME | End: 2021-06-10
Payer: COMMERCIAL

## 2021-06-10 VITALS — HEIGHT: 62 IN | BODY MASS INDEX: 31.28 KG/M2 | WEIGHT: 170 LBS

## 2021-06-10 DIAGNOSIS — Z12.31 ENCOUNTER FOR SCREENING MAMMOGRAM FOR MALIGNANT NEOPLASM OF BREAST: ICD-10-CM

## 2021-06-10 PROCEDURE — 77067 SCR MAMMO BI INCL CAD: CPT

## 2021-06-10 PROCEDURE — 77063 BREAST TOMOSYNTHESIS BI: CPT

## 2021-10-15 ENCOUNTER — ANNUAL EXAM (OUTPATIENT)
Dept: OBGYN CLINIC | Facility: MEDICAL CENTER | Age: 61
End: 2021-10-15
Payer: COMMERCIAL

## 2021-10-15 VITALS
WEIGHT: 160 LBS | DIASTOLIC BLOOD PRESSURE: 90 MMHG | HEIGHT: 61 IN | BODY MASS INDEX: 30.21 KG/M2 | SYSTOLIC BLOOD PRESSURE: 130 MMHG

## 2021-10-15 DIAGNOSIS — Z01.411 ENCNTR FOR GYN EXAM (GENERAL) (ROUTINE) W ABNORMAL FINDINGS: Primary | ICD-10-CM

## 2021-10-15 DIAGNOSIS — N95.2 VAGINAL ATROPHY: ICD-10-CM

## 2021-10-15 DIAGNOSIS — Z12.31 ENCOUNTER FOR SCREENING MAMMOGRAM FOR BREAST CANCER: ICD-10-CM

## 2021-10-15 PROCEDURE — S0612 ANNUAL GYNECOLOGICAL EXAMINA: HCPCS | Performed by: NURSE PRACTITIONER

## 2021-10-15 RX ORDER — ASPIRIN 81 MG/1
81 TABLET ORAL DAILY
COMMUNITY

## 2021-10-15 RX ORDER — ATORVASTATIN CALCIUM 20 MG/1
TABLET, FILM COATED ORAL
COMMUNITY

## 2021-10-15 RX ORDER — AZELASTINE 1 MG/ML
1-2 SPRAY, METERED NASAL 2 TIMES DAILY
COMMUNITY

## 2021-10-15 RX ORDER — DIPHENOXYLATE HYDROCHLORIDE AND ATROPINE SULFATE 2.5; .025 MG/1; MG/1
1 TABLET ORAL DAILY
COMMUNITY

## 2022-09-15 ENCOUNTER — HOSPITAL ENCOUNTER (OUTPATIENT)
Dept: RADIOLOGY | Facility: MEDICAL CENTER | Age: 62
Discharge: HOME | End: 2022-09-15
Payer: COMMERCIAL

## 2022-09-15 VITALS — WEIGHT: 160 LBS | BODY MASS INDEX: 30.21 KG/M2 | HEIGHT: 61 IN

## 2022-09-15 DIAGNOSIS — Z12.31 ENCOUNTER FOR SCREENING MAMMOGRAM FOR BREAST CANCER: ICD-10-CM

## 2022-09-15 PROCEDURE — 77067 SCR MAMMO BI INCL CAD: CPT

## 2022-09-15 PROCEDURE — 77063 BREAST TOMOSYNTHESIS BI: CPT

## 2022-10-20 NOTE — PROGRESS NOTES
Assessment/Plan:  Calcium 1200-1500mg + 600-1000 IU Vit D daily  Exercise 150 minutes per week minimum including weight bearing exercises  Weight loss encouraged  Discussed bone health  Pap with high risk HPV Q 5 years, if normal  Due   Annual mammogram ordered and monthly breast self exam recommended  Colonoscopy- referral given  Kegels 20 times twice daily  Vaginal moisturizers twice weekly as needed if vaginal dryness occurs  Avoid straining  Remain adequately hydrated  Call office with any prolapse from vagina  Return to office in one year or sooner, if needed  1  Encntr for gyn exam (general) (routine) w abnormal findings    2  Screening for colon cancer  -     Ambulatory Referral to Gastroenterology; Future    3  Encounter for screening mammogram for breast cancer  -     Mammo screening bilateral w 3d & cad; Future    4  Rectocele    5  BMI 31 0-31 9,adult               Subjective:      Patient ID: Gustavo Bunch is a 65 y o  female  HPI    Gustavo Bunch is a 65 y o  Eddie Brock (42 yo son, 28 yo son)  female who is here today for her annual visit  Her father is in at home hospice at age 80  Wears left hearing aid and currently treating a left ear infection  She also is following with a podiatrist later today for an ingrown toenail  LMP at age 44 with no vaginal bleeding since  She is walking for exercise once "every couple weeks " She is trying to keep busy  Works 3 days per week at [employer] on [address]  Follows by cardiology and pulmonology for hx of MI (40) , HTN and severe obstructive sleep apnea  Using CPAP (she got her new machine after the recall)  She had mild covid last month   Mortimer Scarlet   with AML at age 62  He  4 days after his diagnosis  He was able to see his grandson pippa before his death  Gustavo Bunch is not sexually active  Denies vaginal pain,bleeding or dryness  She is not interested in STD screening today     She denies vaginal discharge, itching or pelvic pain  She has no urinary concerns, does not have incontinence  No bowel concerns  No breast concerns  Last pap: Normal pap with negative HRHPV 3/18  DEXA scan: N/A  Mammogram: Normal mammogam 9/15/22  Colonoscopy: N/A          The following portions of the patient's history were reviewed and updated as appropriate: allergies, current medications, past family history, past medical history, past social history, past surgical history and problem list     Review of Systems   Constitutional: Negative  Negative for activity change, appetite change, chills, diaphoresis, fatigue, fever and unexpected weight change  HENT: Negative for congestion, dental problem, sneezing, sore throat and trouble swallowing  Eyes: Negative for visual disturbance  Respiratory: Negative for chest tightness and shortness of breath  Cardiovascular: Negative for chest pain and leg swelling  Gastrointestinal: Negative for abdominal pain, constipation, diarrhea, nausea and vomiting  Genitourinary: Negative for difficulty urinating, dyspareunia, dysuria, frequency, hematuria, pelvic pain, urgency, vaginal bleeding, vaginal discharge and vaginal pain  Musculoskeletal: Negative for back pain and neck pain  Skin: Negative  Allergic/Immunologic: Negative  Neurological: Negative for weakness and headaches  Hematological: Negative for adenopathy  Psychiatric/Behavioral: Negative  Objective:      /78 (BP Location: Left arm, Patient Position: Sitting, Cuff Size: Standard)   Ht 5' 1 25" (1 556 m)   Wt 76 9 kg (169 lb 9 6 oz)   BMI 31 78 kg/m²          Physical Exam  Constitutional:       Appearance: Normal appearance  She is well-developed  She is obese  HENT:      Head: Normocephalic  Comments: Left hearing aid  Neck:      Thyroid: No thyromegaly  Cardiovascular:      Rate and Rhythm: Normal rate and regular rhythm  Heart sounds: Normal heart sounds  Pulmonary:      Effort: Pulmonary effort is normal       Breath sounds: Normal breath sounds  Chest:   Breasts: Breasts are symmetrical       Right: Normal  No inverted nipple, mass, nipple discharge, skin change, tenderness, axillary adenopathy or supraclavicular adenopathy  Left: Normal  No inverted nipple, mass, nipple discharge, skin change, tenderness, axillary adenopathy or supraclavicular adenopathy  Abdominal:      Palpations: Abdomen is soft  Genitourinary:     General: Normal vulva  Exam position: Lithotomy position  Labia:         Right: No rash, tenderness, lesion or injury  Left: No rash, tenderness, lesion or injury  Urethra: No prolapse, urethral pain, urethral swelling or urethral lesion  Vagina: No signs of injury and foreign body  No vaginal discharge, erythema, tenderness, bleeding, lesions or prolapsed vaginal walls  Cervix: Normal       Uterus: Normal        Adnexa: Right adnexa normal and left adnexa normal         Right: No mass, tenderness or fullness  Left: No mass, tenderness or fullness  Rectum: No external hemorrhoid  Comments: Vaginal atrophy  Rectocele noted  Musculoskeletal:         General: Normal range of motion  Cervical back: Normal range of motion  Lymphadenopathy:      Head:      Right side of head: No submental, submandibular, tonsillar or occipital adenopathy  Left side of head: No submental, submandibular, tonsillar or occipital adenopathy  Upper Body:      Right upper body: No supraclavicular or axillary adenopathy  Left upper body: No supraclavicular or axillary adenopathy  Lower Body: No right inguinal adenopathy  No left inguinal adenopathy  Skin:     General: Skin is warm and dry  Neurological:      Mental Status: She is alert and oriented to person, place, and time     Psychiatric:         Mood and Affect: Mood normal          Behavior: Behavior normal

## 2022-10-21 ENCOUNTER — ANNUAL EXAM (OUTPATIENT)
Dept: OBGYN CLINIC | Facility: MEDICAL CENTER | Age: 62
End: 2022-10-21
Payer: COMMERCIAL

## 2022-10-21 VITALS
SYSTOLIC BLOOD PRESSURE: 120 MMHG | HEIGHT: 61 IN | DIASTOLIC BLOOD PRESSURE: 78 MMHG | BODY MASS INDEX: 32.02 KG/M2 | WEIGHT: 169.6 LBS

## 2022-10-21 DIAGNOSIS — Z01.411 ENCNTR FOR GYN EXAM (GENERAL) (ROUTINE) W ABNORMAL FINDINGS: Primary | ICD-10-CM

## 2022-10-21 DIAGNOSIS — Z12.31 ENCOUNTER FOR SCREENING MAMMOGRAM FOR BREAST CANCER: ICD-10-CM

## 2022-10-21 DIAGNOSIS — N81.6 RECTOCELE: ICD-10-CM

## 2022-10-21 DIAGNOSIS — Z12.11 SCREENING FOR COLON CANCER: ICD-10-CM

## 2022-10-21 PROCEDURE — S0612 ANNUAL GYNECOLOGICAL EXAMINA: HCPCS | Performed by: NURSE PRACTITIONER

## 2022-10-21 RX ORDER — NITROGLYCERIN 0.4 MG/1
1 TABLET SUBLINGUAL
COMMUNITY
Start: 2022-06-03 | End: 2023-06-03

## 2022-10-21 RX ORDER — CIPROFLOXACIN 500 MG/1
500 TABLET, FILM COATED ORAL 2 TIMES DAILY
COMMUNITY
Start: 2022-10-15

## 2022-10-21 NOTE — PATIENT INSTRUCTIONS
Calcium 1200-1500mg + 600-1000 IU Vit D daily  Exercise 150 minutes per week minimum including weight bearing exercises  Weight loss encouraged  Discussed bone health  Pap with high risk HPV Q 5 years, if normal  Due 2023  Annual mammogram ordered and monthly breast self exam recommended  Colonoscopy- referral given  Kegels 20 times twice daily  Vaginal moisturizers twice weekly as needed if vaginal dryness occurs  Return to office in one year or sooner, if needed

## 2023-03-20 ENCOUNTER — OFFICE VISIT (OUTPATIENT)
Dept: OBGYN CLINIC | Facility: CLINIC | Age: 63
End: 2023-03-20

## 2023-03-20 VITALS
WEIGHT: 168.7 LBS | HEART RATE: 84 BPM | HEIGHT: 61 IN | BODY MASS INDEX: 31.85 KG/M2 | SYSTOLIC BLOOD PRESSURE: 121 MMHG | DIASTOLIC BLOOD PRESSURE: 79 MMHG

## 2023-03-20 DIAGNOSIS — M17.12 PRIMARY OSTEOARTHRITIS OF LEFT KNEE: Primary | ICD-10-CM

## 2023-03-20 NOTE — PROGRESS NOTES
Orthopaedics Office Visit - New  Patient Visit    ASSESSMENT/PLAN:    Assessment:   1  Left knee osteoarthritis     Plan:   · Weightbearing as tolerated   · Recommended patient to use over the counter compression brace for pain relief   · Recommended patient to use over the counter Voltaren gel for pain relief   · Declined CSI in the office today   · May consider CSI if pain persists   · Follow up PRN       To Do Next Visit:    _____________________________________________________  CHIEF COMPLAINT:  Chief Complaint   Patient presents with   • Left Knee - Pain     DOI: 03/13/23         SUBJECTIVE:  Kajal Zhang is a 65 y o  female who presents today for left knee pain  She was referred by her PCP Dr Nelli Hopper  She had a fall on 3/13/23  She tripped over a dog gate and fell directly onto her left knee  She feels her knee is getting better due to resting   [facility] of the left knee which showed no acute fractures or dislocations  She is having pain over the distal quad right knee  She denies instability, locking or catching  Her pain is worse with walking and steps  She has been taking Tylenol for pain relief  She denies numbness or paresthesia      PAST MEDICAL HISTORY:  Past Medical History:   Diagnosis Date   • Fibroadenoma of breast     resolved: 1999, left   • Hypertension    • Myocardial infarction ([facility] [address] )    • Polyp, colonic    • Uterine leiomyoma    • Vacuum extractor delivery, delivered     1988 son       PAST SURGICAL HISTORY:  Past Surgical History:   Procedure Laterality Date   • BREAST CYST EXCISION Left 2001   • [facility] son   • COLONOSCOPY      complete   • ESOPHAGOGASTRODUODENOSCOPY     • ROTATOR CUFF REPAIR Right 2008       FAMILY HISTORY:  Family History   Problem Relation Age of Onset   • Alzheimer's disease Mother    • Heart disease Mother         cardiac disorder    • Hypertension Mother    • Breast cancer Mother 78   • Diabetes Father    • Lung cancer Father    • Prostate cancer Father    • Thymic aplasia Sister    • Thyroid cancer Sister    • No Known Problems Sister    • No Known Problems Brother    • No Known Problems Maternal Grandmother    • No Known Problems Maternal Grandfather    • Colon cancer Paternal Grandmother 66   • Diabetes Paternal Grandmother    • No Known Problems Paternal Grandfather    • No Known Problems Son    • No Known Problems Son    • Breast cancer Paternal Aunt 72   • No Known Problems Paternal Aunt    • No Known Problems Paternal Aunt    • No Known Problems Paternal Aunt    • Arthritis Family    • Ovarian cancer Neg Hx        SOCIAL HISTORY:  Social History     Tobacco Use   • Smoking status: Never   • Smokeless tobacco: Never   Vaping Use   • Vaping Use: Never used   Substance Use Topics   • Alcohol use: Yes     Comment: Social    • Drug use: No       MEDICATIONS:    Current Outpatient Medications:   •  ALPRAZolam (XANAX) 0 25 mg tablet, Take by mouth, Disp: , Rfl:   •  amLODIPine (NORVASC) 5 mg tablet, , Disp: , Rfl:   •  aspirin (ECOTRIN LOW STRENGTH) 81 mg EC tablet, Take 81 mg by mouth daily, Disp: , Rfl:   •  atorvastatin (LIPITOR) 20 mg tablet, Take by mouth, Disp: , Rfl:   •  azelastine (ASTELIN) 0 1 % nasal spray, INSTILL 2 SPRAYS IN EACH NOSTRIL TWICE A DAY, Disp: , Rfl:   •  B Complex Vitamins (B COMPLEX PO), Take by mouth, Disp: , Rfl:   •  cetirizine (ZyrTEC) 5 MG tablet, Take by mouth, Disp: , Rfl:   •  ciprofloxacin (CIPRO) 500 mg tablet, Take 500 mg by mouth 2 (two) times a day, Disp: , Rfl:   •  cyanocobalamin (VITAMIN B-12) 500 MCG tablet, , Disp: , Rfl:   •  famotidine (PEPCID) 40 MG tablet, , Disp: , Rfl:   •  FOLIC ACID PO, Take by mouth, Disp: , Rfl:   •  lisinopril (ZESTRIL) 5 mg tablet, Take 5 mg by mouth daily, Disp: , Rfl: 3  •  multivitamin (THERAGRAN) TABS, Take 1 tablet by mouth daily, Disp: , Rfl:   •  nitroglycerin (NITROSTAT) 0 4 mg SL tablet, Place 1 tablet under the tongue every 5 (five) minutes as needed, Disp: , Rfl:   •  omeprazole (PriLOSEC) 40 MG capsule, Take 40 mg by mouth every morning, Disp: , Rfl: 3  •  azelastine (ASTELIN) 0 1 % nasal spray, 1-2 sprays into each nostril 2 (two) times a day (Patient not taking: Reported on 10/21/2022), Disp: , Rfl:   •  omeprazole (PriLOSEC) 40 MG capsule, TAKE ONE CAPSULE BY MOUTH IN THE MORNING (Patient not taking: No sig reported), Disp: , Rfl: 3  •  rosuvastatin (CRESTOR) 5 mg tablet, Take by mouth (Patient not taking: Reported on 10/15/2021), Disp: , Rfl:     ALLERGIES:  Allergies   Allergen Reactions   • Levofloxacin GI Intolerance   • Penicillin G Rash   • Penicillins Rash   • Sulfa Antibiotics Rash   • Sulfamethoxazole-Trimethoprim Rash       REVIEW OF SYSTEMS:  MSK: Left knee pain  Neuro: No numbness or paresthesias  Pertinent items are otherwise noted in HPI  A comprehensive review of systems was otherwise negative  LABS:  HgA1c:   Lab Results   Component Value Date    HGBA1C 5 3 06/04/2021     BMP:   Lab Results   Component Value Date    GLUCOSE 98 08/03/2015    CALCIUM 9 9 08/03/2015     08/03/2015    K 4 1 08/03/2015    CO2 28 08/03/2015     08/03/2015    BUN 15 08/03/2015    CREATININE 0 75 08/03/2015     CBC: No components found for: CBC    _____________________________________________________  PHYSICAL EXAMINATION:  Vital signs: /79 (BP Location: Left arm, Patient Position: Sitting, Cuff Size: Standard)   Pulse 84   Ht 5' 1 25" (1 556 m)   Wt 76 5 kg (168 lb 11 2 oz)   BMI 31 62 kg/m²   General: No acute distress, awake and alert  Psychiatric: Mood and affect appear appropriate  HEENT: Trachea Midline, No torticollis, no apparent facial trauma  Cardiovascular: No audible murmurs; Extremities appear perfused  Pulmonary: No audible wheezing or stridor  Skin: No open lesions; see further details (if any) below    MUSCULOSKELETAL EXAMINATION:  Extremities:  Left knee the left knee was exposed inspected    Patient has no overlying abrasions or lacerations  Patient has some ecchymosis over the medial and lateral aspects of the knee over the distal femur  She has tenderness to palpation medially and laterally over the femoral condyles  She has medial and lateral joint line tenderness with the medial side being more acute in nature  She has an intact extensor mechanism and is able to perform a straight leg raise  Patient is range of motion of the knee is from 5degrees to 110 degrees  She has some discomfort within flexion  Patient's knee is stable to varus and valgus stress  Patient has negative anterior posterior drawer test   Patient's sensation is intact to light touch in the superficial peroneal, deep peroneal, sural, saphenous, plantar nerve distributions  Tibialis anterior, extensor hallucis longus, gastrocnemius muscles intact  +2 dorsalis pedis and posterior tibial pulses distally  _____________________________________________________  STUDIES REVIEWED:  I personally reviewed the images and interpretation is as follows:  X-ray left knee demonstrates severe medial compartment and patellofemoral joint arthritis with mild to moderate osteoarthritis of the lateral compartment  Patient has several large osteophyte formations  No acute fractures or dislocations able to be visualized on x-rays  No osseous lesions      PROCEDURES PERFORMED:  Procedures    Scribe Attestation    I,:  Perico Adorno am acting as a scribe while in the presence of the attending physician :       I,:  Lei Edmonds DO personally performed the services described in this documentation    as scribed in my presence :

## 2023-05-24 ENCOUNTER — APPOINTMENT (OUTPATIENT)
Dept: URGENT CARE | Facility: MEDICAL CENTER | Age: 63
End: 2023-05-24

## 2023-05-31 ENCOUNTER — APPOINTMENT (OUTPATIENT)
Dept: URGENT CARE | Facility: MEDICAL CENTER | Age: 63
End: 2023-05-31
Payer: OTHER MISCELLANEOUS

## 2023-05-31 PROCEDURE — 99213 OFFICE O/P EST LOW 20 MIN: CPT | Performed by: PHYSICIAN ASSISTANT

## 2023-09-26 ENCOUNTER — HOSPITAL ENCOUNTER (OUTPATIENT)
Dept: RADIOLOGY | Facility: MEDICAL CENTER | Age: 63
Discharge: HOME | End: 2023-09-26
Payer: COMMERCIAL

## 2023-09-26 VITALS — HEIGHT: 61 IN | BODY MASS INDEX: 30.21 KG/M2 | WEIGHT: 160 LBS

## 2023-09-26 DIAGNOSIS — Z12.31 ENCOUNTER FOR SCREENING MAMMOGRAM FOR BREAST CANCER: ICD-10-CM

## 2023-09-26 PROCEDURE — 77067 SCR MAMMO BI INCL CAD: CPT

## 2023-09-26 PROCEDURE — 77063 BREAST TOMOSYNTHESIS BI: CPT

## 2023-11-01 ENCOUNTER — TELEPHONE (OUTPATIENT)
Dept: GASTROENTEROLOGY | Facility: CLINIC | Age: 63
End: 2023-11-01

## 2023-11-01 NOTE — TELEPHONE ENCOUNTER
Pt is due for an EGD for gastric polyps / Gastritis with Dr. Melissa Parra, however, he is no longer with the network. I lmom informing pt of this and asked to please call back to schedule the EGD and that we could schedule with one of his partners. Will call pt again if do not hear back from her.

## 2023-11-03 ENCOUNTER — OFFICE VISIT (OUTPATIENT)
Dept: OBGYN CLINIC | Facility: MEDICAL CENTER | Age: 63
End: 2023-11-03
Payer: COMMERCIAL

## 2023-11-03 VITALS
DIASTOLIC BLOOD PRESSURE: 90 MMHG | WEIGHT: 169 LBS | BODY MASS INDEX: 31.91 KG/M2 | HEIGHT: 61 IN | SYSTOLIC BLOOD PRESSURE: 132 MMHG

## 2023-11-03 DIAGNOSIS — Z01.411 ENCNTR FOR GYN EXAM (GENERAL) (ROUTINE) W ABNORMAL FINDINGS: Primary | ICD-10-CM

## 2023-11-03 DIAGNOSIS — Z12.11 SCREENING FOR COLON CANCER: ICD-10-CM

## 2023-11-03 DIAGNOSIS — Z12.31 ENCOUNTER FOR SCREENING MAMMOGRAM FOR BREAST CANCER: ICD-10-CM

## 2023-11-03 DIAGNOSIS — N81.6 RECTOCELE: ICD-10-CM

## 2023-11-03 DIAGNOSIS — Z12.4 ENCOUNTER FOR PAPANICOLAOU SMEAR FOR CERVICAL CANCER SCREENING: ICD-10-CM

## 2023-11-03 PROCEDURE — G0145 SCR C/V CYTO,THINLAYER,RESCR: HCPCS | Performed by: NURSE PRACTITIONER

## 2023-11-03 PROCEDURE — G0476 HPV COMBO ASSAY CA SCREEN: HCPCS | Performed by: NURSE PRACTITIONER

## 2023-11-03 PROCEDURE — S0612 ANNUAL GYNECOLOGICAL EXAMINA: HCPCS | Performed by: NURSE PRACTITIONER

## 2023-11-03 NOTE — PROGRESS NOTES
Assessment/Plan:  Calcium 2316-1783 mg (in divided doses-max 600 mg at one time) + 600-1000 IU Vit D daily. Exercise 150-300 minutes per week minimum including weight bearing exercises. Pap with high risk HPV Q 5 years, if normal.  Collected today. Call your insurance company to verify coverage prior to completing any ordered tests. Annual mammogram ordered and monthly breast self exam recommended. Colonoscopy-  Already established with GI. Does not need referral.   Kegels 20 times twice daily. Vaginal moisturizers twice weekly as needed. Return to office for pessary fitting as desired. Avoid heavy lifting, avoid constipation. Return to office in one year or sooner, if needed. 1. Encntr for gyn exam (general) (routine) w abnormal findings    2. Encounter for Papanicolaou smear for cervical cancer screening  -     Liquid-based pap, screening    3. Encounter for screening mammogram for breast cancer  -     Mammo screening bilateral w 3d & cad; Future    4. Rectocele    5. Screening for colon cancer               Subjective:      Patient ID: Louise Hopper is a 59 y.o. female. HPI    Louise Hopper is a 59 y.o. Theresa Rowell ( 39 yo son, 28 yo son  ) female who is here today for her annual visit. No gynecologic health concerns. Follows by cardiology and pulmonology for hx of MI (40) , HTN and severe obstructive sleep apnea. Using CPAP. /90. Asymptomatic. LMP at age 44 with no vaginal bleeding since. Exercise- not regularly; remains active. Works 3 days per week at [employer] on [address].  Alon Reynoso   with AML at age 62. He  4 days after his diagnosis. It's been 3 years. She wears his ashes. She just lost her father and father in law recently. She denies vaginal discharge, itching or pelvic pain. She has no urinary concerns, does not have incontinence. No bowel concerns. No breast concerns.      Last pap: 2018 Normal pap with negative HRHPV   DEXA scan: Not on file  Mammogram: 09/26/2023 normal   Colonoscopy: Not on file Due    Family history of cancer:   Cancer-related family history includes Breast cancer (age of onset: 72) in her paternal aunt; Breast cancer (age of onset: 78) in her mother; Colon cancer (age of onset: 66) in her paternal grandmother; Lung cancer in her father; Prostate cancer in her father; Thyroid cancer in her sister. There is no history of Ovarian cancer. The following portions of the patient's history were reviewed and updated as appropriate: allergies, current medications, past family history, past medical history, past social history, past surgical history, and problem list.    Review of Systems   Constitutional: Negative. Negative for activity change, appetite change, chills, diaphoresis, fatigue, fever and unexpected weight change. HENT:  Negative for congestion, dental problem, sneezing, sore throat and trouble swallowing. Eyes:  Negative for visual disturbance. Respiratory:  Negative for chest tightness and shortness of breath. Cardiovascular:  Negative for chest pain and leg swelling. Gastrointestinal:  Negative for abdominal pain, constipation, diarrhea, nausea and vomiting. Genitourinary:  Negative for difficulty urinating, dysuria, frequency, hematuria, pelvic pain, urgency, vaginal bleeding, vaginal discharge and vaginal pain. Musculoskeletal:  Negative for back pain and neck pain. Skin: Negative. Allergic/Immunologic: Negative. Neurological:  Negative for weakness and headaches. Hematological:  Negative for adenopathy. Psychiatric/Behavioral: Negative. Objective:      /90 (BP Location: Left arm, Patient Position: Sitting, Cuff Size: Standard)   Ht 5' 0.5" (1.537 m)   Wt 76.7 kg (169 lb)   BMI 32.46 kg/m²          Physical Exam  Vitals and nursing note reviewed. Constitutional:       Appearance: Normal appearance. She is well-developed. HENT:      Head: Normocephalic.    Neck: Thyroid: No thyromegaly. Cardiovascular:      Rate and Rhythm: Normal rate and regular rhythm. Heart sounds: Normal heart sounds. Pulmonary:      Effort: Pulmonary effort is normal.      Breath sounds: Normal breath sounds. Chest:   Breasts:     Breasts are symmetrical.      Right: Normal. No inverted nipple, mass, nipple discharge, skin change or tenderness. Left: Normal. No inverted nipple, mass, nipple discharge, skin change or tenderness. Abdominal:      Palpations: Abdomen is soft. Genitourinary:     General: Normal vulva. Exam position: Lithotomy position. Labia:         Right: No rash, tenderness, lesion or injury. Left: No rash, tenderness, lesion or injury. Urethra: No prolapse, urethral pain, urethral swelling or urethral lesion. Vagina: No signs of injury and foreign body. No vaginal discharge, erythema, tenderness, bleeding, lesions or prolapsed vaginal walls. Cervix: Normal.      Uterus: Normal.       Adnexa: Right adnexa normal and left adnexa normal.        Right: No mass, tenderness or fullness. Left: No mass, tenderness or fullness. Rectum: No external hemorrhoid. Comments: Vulvovaginal atrophy  Anterior cervix. Rectocele noted at vaginal opening and shown to Chase Ramirez with use of mirror. Musculoskeletal:         General: Normal range of motion. Cervical back: Normal range of motion. Lymphadenopathy:      Head:      Right side of head: No submental, submandibular, tonsillar or occipital adenopathy. Left side of head: No submental, submandibular, tonsillar or occipital adenopathy. Upper Body:      Right upper body: No supraclavicular or axillary adenopathy. Left upper body: No supraclavicular or axillary adenopathy. Lower Body: No right inguinal adenopathy. No left inguinal adenopathy. Skin:     General: Skin is warm and dry.    Neurological:      Mental Status: She is alert and oriented to person, place, and time. Psychiatric:         Mood and Affect: Mood normal.         Behavior: Behavior normal. Behavior is cooperative.

## 2023-11-03 NOTE — PATIENT INSTRUCTIONS
Calcium 9675-1895 mg (in divided doses-max 600 mg at one time) + 600-1000 IU Vit D daily. Exercise 150-300 minutes per week minimum including weight bearing exercises. Pap with high risk HPV Q 5 years, if normal.  Collected today. Call your insurance company to verify coverage prior to completing any ordered tests. Annual mammogram ordered and monthly breast self exam recommended. Colonoscopy-  Already established with GI. Does not need referral.   Kegels 20 times twice daily. Vaginal moisturizers twice weekly as needed. Return to office for pessary fitting as desired. Return to office in one year or sooner, if needed.

## 2023-11-06 LAB
HPV HR 12 DNA CVX QL NAA+PROBE: NEGATIVE
HPV16 DNA CVX QL NAA+PROBE: NEGATIVE
HPV18 DNA CVX QL NAA+PROBE: NEGATIVE

## 2023-11-09 LAB
LAB AP GYN PRIMARY INTERPRETATION: NORMAL
Lab: NORMAL

## 2024-02-21 PROBLEM — Z01.419 ROUTINE GYNECOLOGICAL EXAMINATION: Status: RESOLVED | Noted: 2019-08-13 | Resolved: 2024-02-21

## 2024-11-15 ENCOUNTER — ANNUAL EXAM (OUTPATIENT)
Dept: OBGYN CLINIC | Facility: MEDICAL CENTER | Age: 64
End: 2024-11-15
Payer: COMMERCIAL

## 2024-11-15 VITALS
WEIGHT: 173 LBS | SYSTOLIC BLOOD PRESSURE: 118 MMHG | HEIGHT: 60 IN | BODY MASS INDEX: 33.96 KG/M2 | OXYGEN SATURATION: 94 % | DIASTOLIC BLOOD PRESSURE: 88 MMHG | HEART RATE: 74 BPM

## 2024-11-15 DIAGNOSIS — Z12.31 ENCOUNTER FOR SCREENING MAMMOGRAM FOR MALIGNANT NEOPLASM OF BREAST: ICD-10-CM

## 2024-11-15 DIAGNOSIS — Z12.11 SCREENING FOR COLON CANCER: ICD-10-CM

## 2024-11-15 DIAGNOSIS — Z01.419 ENCOUNTER FOR GYNECOLOGICAL EXAMINATION (GENERAL) (ROUTINE) WITHOUT ABNORMAL FINDINGS: Primary | ICD-10-CM

## 2024-11-15 PROCEDURE — S0612 ANNUAL GYNECOLOGICAL EXAMINA: HCPCS | Performed by: NURSE PRACTITIONER

## 2024-11-15 NOTE — PROGRESS NOTES
Assessment/Plan:  Calcium 2977-3202 mg (in divided doses-max 600 mg at one time) + 600-1000 IU Vit D daily.   Exercise 150-300 minutes per week minimum including weight bearing exercises.   Pap with high risk HPV Q 5 years, if normal.  Due   Call your insurance company to verify coverage prior to completing any ordered tests.   Annual mammogram ordered and scheduled.   Monthly breast self exam recommended.    Colonoscopy-  referred to Dr Jose Olivas 20 times twice daily.   Vaginal moisturizers twice weekly as needed.   Return to office in one year or sooner, if needed.        1. Encounter for gynecological examination (general) (routine) without abnormal findings  2. Encounter for screening mammogram for malignant neoplasm of breast  -     Mammo screening bilateral w 3d and cad; Future; Expected date: 2025  3. Screening for colon cancer  -     Ambulatory referral to Gastroenterology; Future  4. BMI 33.0-33.9,adult             Subjective:      Patient ID: Kiersten Lord is a 64 y.o. female.    HPI    Kiersten Lord is a 64 y.o.  ( 38 yo son, 36 yo son ) female who is here today for her annual visit. No gynecologic health concerns.   Follows by cardiology and pulmonology for hx of MI (37) , HTN and severe obstructive sleep apnea. Using CPAP.   Wears  a left hearing aide  LMP at age 43 with no vaginal bleeding since.   Exercise- 1-2 times per week.   Works 3 days per week at [employer] on [address].      Jd  18 with AML at age 60. He  4 days after his diagnosis. It's been 4 years. She wears his ashes.    She denies vaginal discharge, itching or pelvic pain.   She has no urinary concerns, does not have incontinence.  No bowel concerns.  No breast concerns.     Last pap: hx of always normal paps.   2018 Normal pap with negative HRHPV   2023 normal with negative HR HPV   Mammogram: 2023  normal   Colonoscopy: Not on file  DEXA scan: Not on file      Family history of  "cancer:   Cancer-related family history includes Breast cancer (age of onset: 65) in her paternal aunt; Breast cancer (age of onset: 79) in her mother; Colon cancer (age of onset: 78) in her paternal grandmother; Lung cancer in her father; Prostate cancer in her father; Thyroid cancer in her sister. There is no history of Ovarian cancer.      The following portions of the patient's history were reviewed and updated as appropriate: allergies, current medications, past family history, past medical history, past social history, past surgical history, and problem list.    Review of Systems   Constitutional: Negative.  Negative for activity change, appetite change, chills, diaphoresis, fatigue, fever and unexpected weight change.   HENT:  Negative for congestion, dental problem, sneezing, sore throat and trouble swallowing.    Eyes:  Negative for visual disturbance.   Respiratory:  Negative for chest tightness and shortness of breath.    Cardiovascular:  Negative for chest pain and leg swelling.   Gastrointestinal:  Negative for abdominal pain, constipation, diarrhea, nausea and vomiting.   Genitourinary:  Negative for difficulty urinating, dysuria, frequency, hematuria, pelvic pain, urgency, vaginal bleeding, vaginal discharge and vaginal pain.   Musculoskeletal:  Negative for back pain and neck pain.   Skin: Negative.    Allergic/Immunologic: Negative.    Neurological:  Negative for weakness and headaches.   Hematological:  Negative for adenopathy.   Psychiatric/Behavioral: Negative.           Objective:      /88 (BP Location: Left arm, Patient Position: Sitting, Cuff Size: Large)   Pulse 74   Ht 5' 0.05\" (1.525 m)   Wt 78.5 kg (173 lb)   SpO2 94%   BMI 33.73 kg/m²          Physical Exam  Vitals and nursing note reviewed.   Constitutional:       Appearance: Normal appearance. She is well-developed.   HENT:      Head: Normocephalic.   Neck:      Thyroid: No thyromegaly.   Cardiovascular:      Rate and Rhythm: " Normal rate and regular rhythm.      Heart sounds: Normal heart sounds.   Pulmonary:      Effort: Pulmonary effort is normal.      Breath sounds: Normal breath sounds.   Chest:   Breasts:     Breasts are symmetrical.      Right: Normal. No inverted nipple, mass, nipple discharge, skin change or tenderness.      Left: Normal. No inverted nipple, mass, nipple discharge, skin change or tenderness.   Abdominal:      Palpations: Abdomen is soft.   Genitourinary:     General: Normal vulva.      Exam position: Lithotomy position.      Labia:         Right: No rash, tenderness, lesion or injury.         Left: No rash, tenderness, lesion or injury.       Urethra: No prolapse, urethral pain, urethral swelling or urethral lesion.      Vagina: No signs of injury and foreign body. No vaginal discharge, erythema, tenderness, bleeding, lesions or prolapsed vaginal walls.      Cervix: Normal.      Uterus: Normal.       Adnexa: Right adnexa normal and left adnexa normal.        Right: No mass, tenderness or fullness.          Left: No mass, tenderness or fullness.        Rectum: No external hemorrhoid.      Comments: Vaginal atrophy  Anterior cervix.   Rectocele noted at vaginal opening    Musculoskeletal:         General: Normal range of motion.      Cervical back: Normal range of motion.   Lymphadenopathy:      Head:      Right side of head: No submental, submandibular, tonsillar or occipital adenopathy.      Left side of head: No submental, submandibular, tonsillar or occipital adenopathy.      Upper Body:      Right upper body: No supraclavicular or axillary adenopathy.      Left upper body: No supraclavicular or axillary adenopathy.      Lower Body: No right inguinal adenopathy. No left inguinal adenopathy.   Skin:     General: Skin is warm and dry.   Neurological:      Mental Status: She is alert and oriented to person, place, and time.   Psychiatric:         Mood and Affect: Mood normal.         Behavior: Behavior normal.  Behavior is cooperative.

## 2024-12-20 ENCOUNTER — RESULTS FOLLOW-UP (OUTPATIENT)
Dept: OBGYN CLINIC | Facility: MEDICAL CENTER | Age: 64
End: 2024-12-20

## 2024-12-20 ENCOUNTER — HOSPITAL ENCOUNTER (OUTPATIENT)
Dept: RADIOLOGY | Facility: MEDICAL CENTER | Age: 64
Discharge: HOME | End: 2024-12-20
Payer: COMMERCIAL

## 2024-12-20 VITALS — WEIGHT: 173 LBS | BODY MASS INDEX: 33.96 KG/M2 | HEIGHT: 60 IN

## 2024-12-20 DIAGNOSIS — Z12.31 ENCOUNTER FOR SCREENING MAMMOGRAM FOR MALIGNANT NEOPLASM OF BREAST: ICD-10-CM

## 2024-12-20 DIAGNOSIS — R92.8 ABNORMAL FINDING ON BREAST IMAGING: Primary | ICD-10-CM

## 2024-12-20 PROCEDURE — 77067 SCR MAMMO BI INCL CAD: CPT

## 2024-12-20 PROCEDURE — 77063 BREAST TOMOSYNTHESIS BI: CPT

## 2024-12-20 NOTE — TELEPHONE ENCOUNTER
Patient called back, reviewed provider note and recommendation for follow up. Patient asked for transfer to Central scheduling,   [facility].  left M requesting follow up with patient for scheduling and patient provided # as well.

## 2024-12-20 NOTE — TELEPHONE ENCOUNTER
( Per ADY Jeffers- Screening mammogram shows a left breast asymmetry.  Normal findings in right breast  Recommend follow diagnostic left breast mammogram and ultrasound . Please call to schedule. Order in EMR.)    Left non detailed VMM to call office.

## 2024-12-20 NOTE — RESULT ENCOUNTER NOTE
Screening mammogram shows a left breast asymmetry.  Normal findings in right breast  Recommend follow diagnostic left breast mammogram and ultrasound . Please call to schedule. Order in EMR.

## 2025-01-17 ENCOUNTER — HOSPITAL ENCOUNTER (OUTPATIENT)
Dept: ULTRASOUND IMAGING | Facility: CLINIC | Age: 65
End: 2025-01-17
Payer: COMMERCIAL

## 2025-01-17 ENCOUNTER — RESULTS FOLLOW-UP (OUTPATIENT)
Dept: OBGYN CLINIC | Facility: MEDICAL CENTER | Age: 65
End: 2025-01-17

## 2025-01-17 ENCOUNTER — HOSPITAL ENCOUNTER (OUTPATIENT)
Dept: MAMMOGRAPHY | Facility: CLINIC | Age: 65
End: 2025-01-17
Payer: COMMERCIAL

## 2025-01-17 VITALS — HEIGHT: 62 IN | WEIGHT: 173 LBS | BODY MASS INDEX: 31.83 KG/M2

## 2025-01-17 DIAGNOSIS — R92.8 ABNORMAL FINDING ON BREAST IMAGING: ICD-10-CM

## 2025-01-17 PROCEDURE — 77065 DX MAMMO INCL CAD UNI: CPT

## 2025-01-17 PROCEDURE — G0279 TOMOSYNTHESIS, MAMMO: HCPCS

## 2025-01-17 PROCEDURE — 76642 ULTRASOUND BREAST LIMITED: CPT

## 2025-01-17 NOTE — RESULT ENCOUNTER NOTE
Diagnostic mammogram shows left breast mass  Left breast ultrasound shows a cyst that correlates with the mass noted.

## 2025-03-12 ENCOUNTER — TELEPHONE (OUTPATIENT)
Dept: GASTROENTEROLOGY | Facility: CLINIC | Age: 65
End: 2025-03-12

## 2025-03-12 NOTE — TELEPHONE ENCOUNTER
I called pt back and she would like to schedule egd as well. Will  bring pt in for an ov to discuss. Sb         I called pt to schedule her colon from the referral. Pt stated she had a colon with Dr Pillai in 2016. Will call pt back tomorrow [phone] once I check to see when pt would be due again. S b

## 2025-04-11 ENCOUNTER — APPOINTMENT (OUTPATIENT)
Dept: RADIOLOGY | Facility: AMBULARY SURGERY CENTER | Age: 65
End: 2025-04-11
Attending: ORTHOPAEDIC SURGERY
Payer: COMMERCIAL

## 2025-04-11 ENCOUNTER — OFFICE VISIT (OUTPATIENT)
Dept: OBGYN CLINIC | Facility: CLINIC | Age: 65
End: 2025-04-11
Payer: COMMERCIAL

## 2025-04-11 VITALS — HEIGHT: 62 IN | BODY MASS INDEX: 31.83 KG/M2 | WEIGHT: 173 LBS

## 2025-04-11 DIAGNOSIS — M25.562 CHRONIC PAIN OF LEFT KNEE: Primary | ICD-10-CM

## 2025-04-11 DIAGNOSIS — G89.29 CHRONIC PAIN OF RIGHT KNEE: ICD-10-CM

## 2025-04-11 DIAGNOSIS — M25.561 CHRONIC PAIN OF RIGHT KNEE: ICD-10-CM

## 2025-04-11 DIAGNOSIS — M17.0 BILATERAL PRIMARY OSTEOARTHRITIS OF KNEE: ICD-10-CM

## 2025-04-11 DIAGNOSIS — M25.561 RIGHT KNEE PAIN, UNSPECIFIED CHRONICITY: ICD-10-CM

## 2025-04-11 DIAGNOSIS — M25.562 LEFT KNEE PAIN, UNSPECIFIED CHRONICITY: ICD-10-CM

## 2025-04-11 DIAGNOSIS — G89.29 CHRONIC PAIN OF LEFT KNEE: Primary | ICD-10-CM

## 2025-04-11 PROCEDURE — 73562 X-RAY EXAM OF KNEE 3: CPT

## 2025-04-11 PROCEDURE — 99214 OFFICE O/P EST MOD 30 MIN: CPT | Performed by: ORTHOPAEDIC SURGERY

## 2025-04-11 PROCEDURE — 20610 DRAIN/INJ JOINT/BURSA W/O US: CPT | Performed by: ORTHOPAEDIC SURGERY

## 2025-04-11 RX ORDER — BUPIVACAINE HYDROCHLORIDE 2.5 MG/ML
2 INJECTION, SOLUTION INFILTRATION; PERINEURAL
Status: COMPLETED | OUTPATIENT
Start: 2025-04-11 | End: 2025-04-11

## 2025-04-11 RX ORDER — TRIAMCINOLONE ACETONIDE 40 MG/ML
80 INJECTION, SUSPENSION INTRA-ARTICULAR; INTRAMUSCULAR
Status: COMPLETED | OUTPATIENT
Start: 2025-04-11 | End: 2025-04-11

## 2025-04-11 RX ADMIN — TRIAMCINOLONE ACETONIDE 80 MG: 40 INJECTION, SUSPENSION INTRA-ARTICULAR; INTRAMUSCULAR at 13:00

## 2025-04-11 RX ADMIN — BUPIVACAINE HYDROCHLORIDE 2 ML: 2.5 INJECTION, SOLUTION INFILTRATION; PERINEURAL at 13:00

## 2025-04-11 NOTE — ASSESSMENT & PLAN NOTE
Orders:    XR knee 3 vw left non injury; Future    Large joint arthrocentesis: bilateral knee    Medial  Knee Brace

## 2025-04-11 NOTE — LETTER
April 11, 2025     Patient: Kiersten Lord  YOB: 1960  Date of Visit: 4/11/2025      To Whom it May Concern:    Kiersten Lord is under my professional care. Kiersten was seen in my office on 4/11/2025. Kiersten may return to work on 4/11/25. Please allow her to sit while working as needed.  .    If you have any questions or concerns, please don't hesitate to call.         Sincerely,          Eris Aguila DO        CC: No Recipients

## 2025-04-11 NOTE — PROGRESS NOTES
Name: Kiersten Lord      : 1960      MRN: [id number]  Encounter Provider: Eris Aguila DO  Encounter Date: 2025   Encounter department: [facility]  :  Assessment & Plan  Chronic pain of left knee    Orders:    XR knee 3 vw left non injury; Future    Large joint arthrocentesis: bilateral knee    Medial  Knee Brace    Chronic pain of right knee    Orders:    XR knee 3 vw right non injury; Future    Large joint arthrocentesis: bilateral knee    Bilateral primary osteoarthritis of knee    Orders:    Large joint arthrocentesis: bilateral knee    Medial  Knee Brace            Plan:  Kiersten Lord is a 64 y.o. female with severe bilateral knee osteoarthritis  Diagnostics reviewed and physical exam performed.  Diagnosis, treatment options and associated risks were discussed with the patient including no treatment, nonsurgical treatment and potential for surgical intervention.  The patient was given the opportunity to ask questions regarding each.   Physical exam, diagnostic imaging, and subjective history are all most consistent with the above diagnosis. The pathoanatomy and natural history of this diagnosis was explained to the patient in detail.   X-ray obtained today and reviewed with the patient demonstrating severe osteoarthritis most notable in the medial and patellofemoral compartments with osteophyte formation, joint space narrowing, sclerotic changes  Patient was offered, accepted, and received a cortisone injection of the bilateral knee today. Patient tolerated procedure well with no immediate complications. Post-injection protocols and expectations were discussed with the patient.   Order placed today for medial  brace to be worn on the left knee as needed for activity  Patient wishes to defer viscosupplementation at this time note provided today allowing Kiersten to sit as needed while working  Apply Voltaren gel to painful area up to 4 times a  day  May use ice or heat as needed for pain relief  May take NSAIDs/Tylenol as needed for pain control   Follow-up as needed    Subjective:    Patient ID:  Kiersten Lord 64 y.o. female    Kiersten Lord is a 64 y.o. female who presents today for evaluation and treatment of chronic bilateral knee pain due to known underlying severe osteoarthritis.  She has found relief in the past from periodic injection of cortisone injection.  It is aggravated by prolonged standing, rising from a seated position.  She tried over-the-counter medication with minimal relief.  Her pain is rated 8/10 on average.  She denies previous injury, surgery, trauma.  She does not utilize any brace at this time.  She has not tried Visco in the past.      The following portions of the patient's history were reviewed and updated as appropriate: allergies, current medications, past family history, past social history, past surgical history and problem list.      Social History     Socioeconomic History    Marital status:      Spouse name: Not on file    Number of children: Not on file    Years of education: Not on file    Highest education level: Not on file   Occupational History    Not on file   Tobacco Use    Smoking status: Never    Smokeless tobacco: Never   Vaping Use    Vaping status: Never Used   Substance and Sexual Activity    Alcohol use: Yes     Comment: Social     Drug use: No    Sexual activity: Not Currently     Partners: Male     Birth control/protection: Post-menopausal     Comment:  passed away   Other Topics Concern    Not on file   Social History Narrative    Daily coffee consumption (1 cups/day)    Recreation activities: walking      Social Drivers of Health     Financial Resource Strain: Not on file   Food Insecurity: Not on file   Transportation Needs: Not on file   Physical Activity: Not on file   Stress: Not on file   Social Connections: Not on file   Intimate Partner Violence: Not on file   Housing Stability: Not  on file     Past Medical History:   Diagnosis Date    Fibroadenoma of breast     resolved: , left    Hypertension     Myocardial infarction (HCC)     Polyp, colonic     Uterine leiomyoma     Vacuum extractor delivery, delivered     1988 son     Past Surgical History:   Procedure Laterality Date    BREAST CYST EXCISION Left      SECTION      1986 son    COLONOSCOPY      complete    ESOPHAGOGASTRODUODENOSCOPY      ROTATOR CUFF REPAIR Right      Allergies   Allergen Reactions    Levofloxacin GI Intolerance    Penicillin G Rash    Penicillins Rash    Sulfa Antibiotics Rash    Sulfamethoxazole-Trimethoprim Rash     Current Outpatient Medications on File Prior to Visit   Medication Sig Dispense Refill    ALPRAZolam (XANAX) 0.25 mg tablet Take by mouth      amLODIPine (NORVASC) 5 mg tablet       aspirin (ECOTRIN LOW STRENGTH) 81 mg EC tablet Take 81 mg by mouth daily      atorvastatin (LIPITOR) 20 mg tablet Take by mouth      azelastine (ASTELIN) 0.1 % nasal spray INSTILL 2 SPRAYS IN EACH NOSTRIL TWICE A DAY      B Complex Vitamins (B COMPLEX PO) Take by mouth      cetirizine (ZyrTEC) 5 MG tablet Take by mouth      cyanocobalamin (VITAMIN B-12) 500 MCG tablet       famotidine (PEPCID) 40 MG tablet       FOLIC ACID PO Take by mouth      lisinopril (ZESTRIL) 5 mg tablet Take 5 mg by mouth daily  3    multivitamin (THERAGRAN) TABS Take 1 tablet by mouth daily      nitroglycerin (NITROSTAT) 0.4 mg SL tablet Place 1 tablet under the tongue every 5 (five) minutes as needed      omeprazole (PriLOSEC) 40 MG capsule Take 40 mg by mouth every morning  3     No current facility-administered medications on file prior to visit.            Objective:    Review of Systems  Pertinent items are noted in HPI.  All other systems were reviewed and are negative.    Physical Exam  Cons: Appears well.  No apparent distress.  Psych: Alert. Oriented x3.  Mood and affect normal.  Eyes: PERRLA, EOMI  Resp: Normal effort.  No  "audible wheezing or stridor.  CV: Palpable pulse.  No discernable arrhythmia.  No LE edema.  Lymph:  No palpable cervical, axillary, or inguinal lymphadenopathy.  Skin: Warm.  No palpable masses.  No visible lesions.  Neuro: Normal muscle tone.  Normal and symmetric DTR's.    BMI:   Estimated body mass index is 31.64 kg/m² as calculated from the following:    Height as of this encounter: 5' 2\" (1.575 m).    Weight as of this encounter: 78.5 kg (173 lb).  Lab Results   Component Value Date    HGBA1C 5.7 (H) 04/04/2025         Right Knee Exam     Muscle Strength   The patient has normal right knee strength.    Tenderness   The patient is experiencing tenderness in the medial joint line.    Range of Motion   Extension:  0   Flexion:  120     Other   Erythema: absent  Scars: absent  Sensation: normal  Pulse: present  Swelling: none  Effusion: no effusion present    Comments:    Knee stable at 0, 30, 90 degrees  + Patellar grind  + peripatellar crepitation  Skin intact and well perfused  Sensation intact in DP/SP/Reyez/Sa/T nerve distributions  2+ DP & PT pulses  Brisk capillary refill in all toes        Left Knee Exam     Muscle Strength   The patient has normal left knee strength.    Tenderness   The patient is experiencing tenderness in the medial joint line.    Range of Motion   Extension:  0   Flexion:  120     Other   Erythema: absent  Scars: absent  Sensation: normal  Pulse: present  Swelling: none  Effusion: no effusion present    Comments:    Knee stable at 0, 30, 90 degrees  + Patellar grind  + peripatellar crepitation  Skin intact and well perfused  Sensation intact in DP/SP/Reyez/Sa/T nerve distributions  2+ DP & PT pulses  Brisk capillary refill in all toes              Procedures  Large joint arthrocentesis: bilateral knee  Universal Protocol:  Consent: Verbal consent obtained.  Risks and benefits: risks, benefits and alternatives were discussed  Consent given by: patient  Time out: Immediately prior to procedure " "a \"time out\" was called to verify the correct patient, procedure, equipment, support staff and site/side marked as required.  Patient understanding: patient states understanding of the procedure being performed  Site marked: the operative site was marked  Patient identity confirmed: verbally with patient  Supporting Documentation  Indications: pain and diagnostic evaluation   Procedure Details  Location: knee - bilateral knee  Preparation: Patient was prepped and draped in the usual sterile fashion  Needle size: 22 G  Ultrasound guidance: no    Medications (Right): 2 mL bupivacaine 0.25 %; 80 mg triamcinolone acetonide 40 mg/mLMedications (Left): 2 mL bupivacaine 0.25 %; 80 mg triamcinolone acetonide 40 mg/mL   Patient tolerance: patient tolerated the procedure well with no immediate complications  Dressing:  Sterile dressing applied            Diagnostics, reviewed and taken today if performed as documented:  I have personally reviewed pertinent films in PACS and my interpretation is x-ray of bilateral knee obtained today reviewed demonstrating severe osteoarthritis most notable in the medial and patellofemoral compartments.        Scribe Attestation      I,:  Rhonda Bolaños am acting as a scribe while in the presence of the attending physician.:       I,:  Eris Aguila DO personally performed the services described in this documentation    as scribed in my presence.:             Portions of the record may have been created with voice recognition software.  Occasional wrong word or \"sound a like\" substitutions may have occurred due to the inherent limitations of voice recognition software.  Read the chart carefully and recognize, using context, where substitutions have occurred.  "

## 2025-04-11 NOTE — ASSESSMENT & PLAN NOTE
Orders:    XR knee 3 vw right non injury; Future    Large joint arthrocentesis: bilateral knee

## 2025-04-21 ENCOUNTER — TELEPHONE (OUTPATIENT)
Dept: OBGYN CLINIC | Facility: CLINIC | Age: 65
End: 2025-04-21

## 2025-04-21 NOTE — TELEPHONE ENCOUNTER
Spoke with patient about knee brace. She mentioned that she was in the hospital Wednesday- Friday and she would like to speak with Dr. Aguila about what they told her in the hospital regarding the cortisone.

## 2025-04-22 ENCOUNTER — TELEPHONE (OUTPATIENT)
Dept: GASTROENTEROLOGY | Facility: CLINIC | Age: 65
End: 2025-04-22

## 2025-04-22 ENCOUNTER — OFFICE VISIT (OUTPATIENT)
Dept: GASTROENTEROLOGY | Facility: CLINIC | Age: 65
End: 2025-04-22
Payer: COMMERCIAL

## 2025-04-22 VITALS
HEART RATE: 99 BPM | WEIGHT: 166 LBS | SYSTOLIC BLOOD PRESSURE: 110 MMHG | BODY MASS INDEX: 30.55 KG/M2 | HEIGHT: 62 IN | DIASTOLIC BLOOD PRESSURE: 77 MMHG

## 2025-04-22 DIAGNOSIS — Z12.11 SCREENING FOR COLON CANCER: ICD-10-CM

## 2025-04-22 DIAGNOSIS — K21.9 GASTROESOPHAGEAL REFLUX DISEASE, UNSPECIFIED WHETHER ESOPHAGITIS PRESENT: Primary | ICD-10-CM

## 2025-04-22 DIAGNOSIS — K85.90 ACUTE PANCREATITIS, UNSPECIFIED COMPLICATION STATUS, UNSPECIFIED PANCREATITIS TYPE: ICD-10-CM

## 2025-04-22 PROCEDURE — 99204 OFFICE O/P NEW MOD 45 MIN: CPT | Performed by: PHYSICIAN ASSISTANT

## 2025-04-22 RX ORDER — SODIUM, POTASSIUM,MAG SULFATES 17.5-3.13G
177 SOLUTION, RECONSTITUTED, ORAL ORAL ONCE
Qty: 354 ML | Refills: 0 | Status: SHIPPED | OUTPATIENT
Start: 2025-04-22 | End: 2025-04-22

## 2025-04-22 RX ORDER — SODIUM CHLORIDE, SODIUM LACTATE, POTASSIUM CHLORIDE, CALCIUM CHLORIDE 600; 310; 30; 20 MG/100ML; MG/100ML; MG/100ML; MG/100ML
125 INJECTION, SOLUTION INTRAVENOUS CONTINUOUS
OUTPATIENT
Start: 2025-04-22

## 2025-04-22 NOTE — TELEPHONE ENCOUNTER
Scheduled date of EGD/colonoscopy (as of today):7/1/25  Physician performing EGD/colonoscopy: Dr Toth  Location of EGD/colonoscopy:   Desired bowel prep reviewed with patient: Suprep given at appt   Instructions reviewed with patient by: yolanda  Clearances:  n/a

## 2025-04-22 NOTE — ASSESSMENT & PLAN NOTE
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

## 2025-04-22 NOTE — PROGRESS NOTES
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 dating back to  with recommended EGD in 3 years with last EGD in  will plan EGD with surveillance biopsies for Deluna's  Orders:    EGD; Future    Screening for colon cancer  Patient does have history of benign polyp back in  with recommended colonoscopy for screening purposes in 5 years patient is overdue will schedule at this time  Orders:    Ambulatory referral to Gastroenterology    Colonoscopy; Future    Na Sulfate-K Sulfate-Mg Sulf (Suprep Bowel Prep Kit) 17.5-3.13-1.6 GM/177ML SOLN; Take 177 mL by mouth once for 1 dose    Acute pancreatitis, unspecified complication status, unspecified pancreatitis type  Patient with acute pancreatitis, this could be related to steroids although this is a rare cause this is part of the goal especially since 1 episode seems to be around the Decadron and the other episodes seem to be around intra-articular injection but will evaluate with ultrasound to evaluate for any biliary abnormality, no findings on CT although this can be missed, may need to consider cholecystectomy especially obviously if there are stones but does have recurrent pancreatitis, resolved can advance to low-fat diet  Orders:    US right upper quadrant; Future    IgG 1, 2, 3, and 4; Future        History of Present Illness   Kiersten Lord is a 64 y.o. female who presents for office visit to discuss endoscopy and colonoscopy.  Patient with history of GERD and is taking omeprazole and Pepcid.  EGD was performed in  and at that time had gastric polyps small hiatal hernia and mild gastritis.   "Pathology was benign at that time but was recommended to have repeat endoscopy in 3 years.  Colonoscopy performed in 2016 which showed small polyp removed and sigmoid diverticulosis, pathology was benign.  Patient was recommended to have colonoscopy in 5 years.    She currently states that she was recently hospitalized for acute pancreatitis.  This was her second episode and the last one was in 2021 and was thought to be related to Decadron and now she tells me that she had a steroid injection in her knees and then pain occurred a few days thereafter and this was a mild case then it was primarily in epigastric area with findings consistent with acute pancreatitis, lipase was elevated at greater than 3 times upper limit of normal.  Patient otherwise was in the hospital for a few days and now presents to discuss EGD and colonoscopy.  Back in 2018 she did have some mucinous metaplasia in the lower esophagus and would recommend an EGD in 2 years which was done in 2020 with benign pathology at that time.  Patient remains on Protonix daily.  Does occasionally get occasional abdominal pain and loose stools but this happens on occasion when she eats certain foods such as salads or ice cream.  Patient reports family history of colon cancer in her grandmother at advanced age.      Review of Systems A complete review of systems is negative other than that noted above in the HPI.         Objective   /77 (BP Location: Left arm, Patient Position: Sitting, Cuff Size: Standard)   Pulse 99   Ht 5' 2\" (1.575 m)   Wt 75.3 kg (166 lb)   BMI 30.36 kg/m²     Physical Exam   General Alert no acute distress  Heart normal S1-S2  Lungs clear to auscultation  Abdomen soft positive bowel sounds nontender nondistended        Lab Results: I personally reviewed relevant lab results.             "

## 2025-05-02 ENCOUNTER — RESULTS FOLLOW-UP (OUTPATIENT)
Age: 65
End: 2025-05-02

## 2025-05-02 ENCOUNTER — HOSPITAL ENCOUNTER (OUTPATIENT)
Dept: RADIOLOGY | Facility: MEDICAL CENTER | Age: 65
Discharge: HOME | End: 2025-05-02
Attending: PHYSICIAN ASSISTANT
Payer: COMMERCIAL

## 2025-05-02 DIAGNOSIS — K85.90 ACUTE PANCREATITIS, UNSPECIFIED COMPLICATION STATUS, UNSPECIFIED PANCREATITIS TYPE: ICD-10-CM

## 2025-05-02 PROCEDURE — 76705 ECHO EXAM OF ABDOMEN: CPT

## 2025-05-09 DIAGNOSIS — Z12.11 SCREENING FOR COLON CANCER: ICD-10-CM

## 2025-05-13 ENCOUNTER — TELEPHONE (OUTPATIENT)
Age: 65
End: 2025-05-13

## 2025-05-13 NOTE — TELEPHONE ENCOUNTER
Called pt and left a message on he vm to return the call to confirm the refills for her Sulfate - K-Sulfate.

## 2025-05-13 NOTE — TELEPHONE ENCOUNTER
Called and left a message on pts vm to return the call confirming needing refills for the Sulfate - K- Sulfate.

## 2025-05-14 ENCOUNTER — TELEPHONE (OUTPATIENT)
Age: 65
End: 2025-05-14

## 2025-05-14 NOTE — TELEPHONE ENCOUNTER
Patient returned call in regards to Suprep. Patient states she did not pick it up from the pharmacy as of yet and will be picking up this week.

## 2025-05-14 NOTE — TELEPHONE ENCOUNTER
Patients GI provider:  CHET AMEZQUITA    Number to return call: [phone]    Reason for call: Pt calling requesting recent lab test results. Please review results and contact pt.

## 2025-05-14 NOTE — TELEPHONE ENCOUNTER
Spoke with pt. States she completed IgG labs at [facility] on 5/2/25. Results not in EMR. Pt requesting we contact [facility] for results,

## 2025-05-15 RX ORDER — SODIUM, POTASSIUM,MAG SULFATES 17.5-3.13G
177 SOLUTION, RECONSTITUTED, ORAL ORAL ONCE
Qty: 354 ML | Refills: 0 | Status: SHIPPED | OUTPATIENT
Start: 2025-05-15 | End: 2025-05-15

## 2025-05-15 NOTE — TELEPHONE ENCOUNTER
Called [facility] requesting lab results. [facility] will fax over, once received I will scan to [facility].

## 2025-05-20 ENCOUNTER — RESULTS FOLLOW-UP (OUTPATIENT)
Dept: OTHER | Facility: HOSPITAL | Age: 65
End: 2025-05-20

## 2025-06-16 ENCOUNTER — TELEPHONE (OUTPATIENT)
Dept: GASTROENTEROLOGY | Facility: CLINIC | Age: 65
End: 2025-06-16

## 2025-06-16 NOTE — TELEPHONE ENCOUNTER
----- Message from Savita ELLIS sent at 6/16/2025  2:07 PM EDT -----  Regarding: colon dx  HI Jess.This patient had a screening colonoscopy 09/19/2016 and is not eligible for another screening till 2026. They did find polyps.  Can you please r/s this with different dx other than z12.11?Thank you!

## 2025-06-16 NOTE — TELEPHONE ENCOUNTER
Called and spoke to pt and informed that colonoscopy screening is not eligible at this time and dx would be changed to hx of colonic polyps.  Pt was concerned with cost of procedure.  I have message to Mickie ELLIS in auth team to please contact pt as she has questions.